# Patient Record
Sex: MALE | Race: WHITE | Employment: FULL TIME | URBAN - METROPOLITAN AREA
[De-identification: names, ages, dates, MRNs, and addresses within clinical notes are randomized per-mention and may not be internally consistent; named-entity substitution may affect disease eponyms.]

---

## 2017-03-26 ENCOUNTER — OFFICE VISIT (OUTPATIENT)
Dept: FAMILY MEDICINE CLINIC | Facility: CLINIC | Age: 52
End: 2017-03-26

## 2017-03-26 VITALS
HEIGHT: 74 IN | TEMPERATURE: 98 F | HEART RATE: 57 BPM | OXYGEN SATURATION: 99 % | DIASTOLIC BLOOD PRESSURE: 100 MMHG | SYSTOLIC BLOOD PRESSURE: 142 MMHG

## 2017-03-26 DIAGNOSIS — R05.9 COUGH: ICD-10-CM

## 2017-03-26 DIAGNOSIS — J01.90 ACUTE SINUSITIS, RECURRENCE NOT SPECIFIED, UNSPECIFIED LOCATION: ICD-10-CM

## 2017-03-26 DIAGNOSIS — H65.01 ACUTE SEROUS OTITIS MEDIA, RIGHT EAR: Primary | ICD-10-CM

## 2017-03-26 PROCEDURE — 99203 OFFICE O/P NEW LOW 30 MIN: CPT | Performed by: PHYSICIAN ASSISTANT

## 2017-03-26 RX ORDER — CEFDINIR 300 MG/1
300 CAPSULE ORAL 2 TIMES DAILY
Qty: 20 CAPSULE | Refills: 0 | Status: SHIPPED | OUTPATIENT
Start: 2017-03-26 | End: 2017-04-05

## 2017-03-26 RX ORDER — PREDNISONE 20 MG/1
TABLET ORAL
Qty: 10 TABLET | Refills: 0 | Status: SHIPPED | OUTPATIENT
Start: 2017-03-26 | End: 2018-04-23 | Stop reason: ALTCHOICE

## 2017-03-26 NOTE — PROGRESS NOTES
CHIEF COMPLAINT:   Patient presents with:  Ear Pain  Sinusitis: x1 month    HPI:   Rosa Maria Melara is a 54year old male who presents for upper and lower respiratory symptoms for  1 months with onset of right ear pain in last few days.   Patient reports PN NOSE: Nostrils patent, mild, thin clear nasal discharge, nasal mucosa erythematous and mildly inflamed. THROAT: Oral mucosa pink, moist. Posterior pharynx is not erythematous. No exudates.    LUNGS: clear to auscultation bilaterally, no wheezes or rhonchi Drinking extra fluids helps thin your mucus. This lets it drain from your sinuses more easily. Have a glass of water every hour or two. A humidifier helps in much the same way. Fluids can also offset the drying effects of certain medicines.  If you use a hu You have an infection of the middle ear, the space behind the eardrum. This is also called acute otitis media (AOM). Sometimes it is caused by the common cold.  This is because congestion can block the internal passage (eustachian tube) that drains fluid fr The patient indicates understanding of these issues and agrees to the plan. The patient is asked to see PCP if sx's persist or worsen.

## 2017-03-26 NOTE — PATIENT INSTRUCTIONS
Self-Care for Sinusitis     Drinking plenty of water can help sinuses drain. Sinusitis can often be managed with self-care. Self-care can keep sinuses moist and make you feel more comfortable. Remember to follow your doctor's instructions closely.  This Middle Ear Infection (Adult)  You have an infection of the middle ear, the space behind the eardrum. This is also called acute otitis media (AOM). Sometimes it is caused by the common cold.  This is because congestion can block the internal passage (eustach

## 2018-04-23 ENCOUNTER — OFFICE VISIT (OUTPATIENT)
Dept: FAMILY MEDICINE CLINIC | Facility: CLINIC | Age: 53
End: 2018-04-23

## 2018-04-23 VITALS
TEMPERATURE: 98 F | RESPIRATION RATE: 16 BRPM | OXYGEN SATURATION: 98 % | SYSTOLIC BLOOD PRESSURE: 148 MMHG | DIASTOLIC BLOOD PRESSURE: 88 MMHG | HEART RATE: 75 BPM

## 2018-04-23 DIAGNOSIS — R03.0 ELEVATED BP WITHOUT DIAGNOSIS OF HYPERTENSION: ICD-10-CM

## 2018-04-23 DIAGNOSIS — J01.00 ACUTE NON-RECURRENT MAXILLARY SINUSITIS: Primary | ICD-10-CM

## 2018-04-23 PROCEDURE — 99213 OFFICE O/P EST LOW 20 MIN: CPT | Performed by: PHYSICIAN ASSISTANT

## 2018-04-23 RX ORDER — AMOXICILLIN AND CLAVULANATE POTASSIUM 875; 125 MG/1; MG/1
1 TABLET, FILM COATED ORAL 2 TIMES DAILY
Qty: 20 TABLET | Refills: 0 | Status: SHIPPED | OUTPATIENT
Start: 2018-04-23 | End: 2018-05-03

## 2018-04-23 RX ORDER — BENZONATATE 200 MG/1
200 CAPSULE ORAL 3 TIMES DAILY PRN
Qty: 30 CAPSULE | Refills: 0 | Status: SHIPPED | OUTPATIENT
Start: 2018-04-23 | End: 2018-12-16 | Stop reason: ALTCHOICE

## 2018-04-23 RX ORDER — FLUTICASONE PROPIONATE 50 MCG
2 SPRAY, SUSPENSION (ML) NASAL DAILY
Qty: 1 INHALER | Refills: 0 | Status: SHIPPED | OUTPATIENT
Start: 2018-04-23 | End: 2018-12-16 | Stop reason: ALTCHOICE

## 2018-04-23 NOTE — PROGRESS NOTES
CHIEF COMPLAINT:   Patient presents with:  URI: 4 weeks. HPI:   Ce Upton is a 48year old male who presents for cold symptoms for now going on  4  weeks. Symptoms have progressed into sinus congestion and just isn't resolving since onset.   Sin sounding voice.    SKIN: no rashes,no suspicious lesions  HEAD: atraumatic, normocephalic, no facial swelling or erythema, no  tenderness on palpation of maxillary sinuses  EYES: conjunctiva clear, EOM intact  EARS: TM's non erythematous  NOSE: nasal mucosa

## 2018-12-16 ENCOUNTER — OFFICE VISIT (OUTPATIENT)
Dept: FAMILY MEDICINE CLINIC | Facility: CLINIC | Age: 53
End: 2018-12-16
Payer: COMMERCIAL

## 2018-12-16 VITALS
DIASTOLIC BLOOD PRESSURE: 90 MMHG | OXYGEN SATURATION: 99 % | HEART RATE: 75 BPM | SYSTOLIC BLOOD PRESSURE: 140 MMHG | TEMPERATURE: 98 F | RESPIRATION RATE: 16 BRPM

## 2018-12-16 DIAGNOSIS — J06.9 UPPER RESPIRATORY TRACT INFECTION, UNSPECIFIED TYPE: Primary | ICD-10-CM

## 2018-12-16 PROCEDURE — 87081 CULTURE SCREEN ONLY: CPT | Performed by: PHYSICIAN ASSISTANT

## 2018-12-16 PROCEDURE — 87880 STREP A ASSAY W/OPTIC: CPT | Performed by: PHYSICIAN ASSISTANT

## 2018-12-16 PROCEDURE — 99213 OFFICE O/P EST LOW 20 MIN: CPT | Performed by: PHYSICIAN ASSISTANT

## 2018-12-16 RX ORDER — FLUTICASONE PROPIONATE 50 MCG
2 SPRAY, SUSPENSION (ML) NASAL DAILY
Qty: 1 INHALER | Refills: 0 | Status: SHIPPED | OUTPATIENT
Start: 2018-12-16 | End: 2019-10-23 | Stop reason: ALTCHOICE

## 2018-12-16 RX ORDER — BENZONATATE 200 MG/1
200 CAPSULE ORAL 3 TIMES DAILY PRN
Qty: 30 CAPSULE | Refills: 0 | Status: SHIPPED | OUTPATIENT
Start: 2018-12-16 | End: 2019-10-23 | Stop reason: ALTCHOICE

## 2018-12-16 NOTE — PROGRESS NOTES
CHIEF COMPLAINT:   Patient presents with:  URI: congestion, cough with phlegm. HPI:   Keila Gaines is a 48year old male who presents for cold symptoms for  2  days. Complains mostly of phlemy cough and some burning in his throat when coughing.   L TM's non erythematolus  NOSE: nasal mucosa reddened and inflammed  THROAT: Posterior pharynx is  erythematous, small PND,  no exudates. NECK: supple, non-tender  LUNGS: clear to auscultation bilaterally, no wheezes or rhonchi. Breathing is non labored.   C

## 2018-12-16 NOTE — PATIENT INSTRUCTIONS
Viral Respiratory Illness [Adult]  You have an Upper Respiratory Illness (URI) caused by a virus. This illness is contagious during the first few days.  It is spread through the air by coughing and sneezing or by direct contact (touching the sick person a © 3600-6187 [address], [address] [place]. All rights reserved. This information is not intended as a substitute for professional medical care. Always follow your healthcare professional's instructions.

## 2019-10-22 PROBLEM — M25.522 PAIN AND SWELLING OF LEFT ELBOW: Status: ACTIVE | Noted: 2017-09-25

## 2019-10-22 PROBLEM — M25.422 PAIN AND SWELLING OF LEFT ELBOW: Status: ACTIVE | Noted: 2017-09-25

## 2019-10-23 ENCOUNTER — OFFICE VISIT (OUTPATIENT)
Dept: FAMILY MEDICINE CLINIC | Facility: CLINIC | Age: 54
End: 2019-10-23
Payer: COMMERCIAL

## 2019-10-23 VITALS
OXYGEN SATURATION: 98 % | HEIGHT: 72 IN | RESPIRATION RATE: 17 BRPM | TEMPERATURE: 99 F | HEART RATE: 82 BPM | BODY MASS INDEX: 39.6 KG/M2 | WEIGHT: 292.38 LBS | SYSTOLIC BLOOD PRESSURE: 146 MMHG | DIASTOLIC BLOOD PRESSURE: 80 MMHG

## 2019-10-23 DIAGNOSIS — E66.9 OBESITY (BMI 30-39.9): ICD-10-CM

## 2019-10-23 DIAGNOSIS — Z12.5 PROSTATE CANCER SCREENING: ICD-10-CM

## 2019-10-23 DIAGNOSIS — R06.83 SNORING: ICD-10-CM

## 2019-10-23 DIAGNOSIS — K21.9 GASTROESOPHAGEAL REFLUX DISEASE WITHOUT ESOPHAGITIS: ICD-10-CM

## 2019-10-23 DIAGNOSIS — R03.0 ELEVATED BLOOD PRESSURE READING: ICD-10-CM

## 2019-10-23 DIAGNOSIS — Z00.00 ENCOUNTER FOR ROUTINE ADULT HEALTH EXAMINATION WITHOUT ABNORMAL FINDINGS: Primary | ICD-10-CM

## 2019-10-23 DIAGNOSIS — Z28.21 INFLUENZA VACCINATION DECLINED BY PATIENT: ICD-10-CM

## 2019-10-23 PROBLEM — M25.522 PAIN AND SWELLING OF LEFT ELBOW: Status: RESOLVED | Noted: 2017-09-25 | Resolved: 2019-10-23

## 2019-10-23 PROBLEM — M25.422 PAIN AND SWELLING OF LEFT ELBOW: Status: RESOLVED | Noted: 2017-09-25 | Resolved: 2019-10-23

## 2019-10-23 PROCEDURE — 80050 GENERAL HEALTH PANEL: CPT | Performed by: FAMILY MEDICINE

## 2019-10-23 PROCEDURE — 83036 HEMOGLOBIN GLYCOSYLATED A1C: CPT | Performed by: FAMILY MEDICINE

## 2019-10-23 PROCEDURE — 36415 COLL VENOUS BLD VENIPUNCTURE: CPT | Performed by: FAMILY MEDICINE

## 2019-10-23 PROCEDURE — 99386 PREV VISIT NEW AGE 40-64: CPT | Performed by: FAMILY MEDICINE

## 2019-10-23 PROCEDURE — 80061 LIPID PANEL: CPT | Performed by: FAMILY MEDICINE

## 2019-10-23 PROCEDURE — 84153 ASSAY OF PSA TOTAL: CPT | Performed by: FAMILY MEDICINE

## 2019-10-23 NOTE — PROGRESS NOTES
Patient presents with:  Physical: annual      HPI:   Ade Fernandez is a 48year old male who presents for a complete physical exam.   Patient is present for complete physical. Feels very well. Up to date with dental visits. No hearing problems.  [place] regularly and does not follow a strict low-sodium diet. He is not a smoker. He has no HA, CP, Sob, or palpitations, or leg swelling.         Wt Readings from Last 3 Encounters:  10/23/19 : 292 lb 6.4 oz (132.6 kg)     BP Readings from Last 3 Encounters:  10 NEURO: no focal abnormalities, and reflexes coordination and gait normal and symmetric. MUSK/SKEL: Normal muscles tones, no joint abnormalities, full ROM of all extremities.     back- normal curves, no scoliosis, normal gait,  No joint or muscle abnorma kg/m²., recommended low fat diet and aerobic exercise 30 minutes three times weekly. The patient indicates understanding of these issues and agrees to the plan. Return in about 3 months (around 1/23/2020) for BP recheck, JANELLE magana.

## 2019-10-23 NOTE — PATIENT INSTRUCTIONS
Prevention Guidelines, Men Ages 48 to 59  Screening tests and vaccines are an important part of managing your health. A screening test is done to find possible disorders or diseases in people who don't have any symptoms.  The goal is to find a disease ear High cholesterol or triglycerides All men in this age group At least every 5 years   HIV Men at increased risk for infection – talk with your healthcare provider At routine exams   Lung cancer Adults age 54 to [de-identified] who have smoked Yearly screening in smokers Pneumococcal conjugate vaccine (PCV13) and pneumococcal polysaccharide vaccine (PPSV23) Men at increased risk for infection – talk with your healthcare provider PCV13: 1 dose ages 23 to 72 (protects against 13 types of pneumococcal bacteria)     PPSV23: 1 The esophagus is a tube that carries food from the mouth to the stomach. A valve (the LES, lower esophageal sphincter) at the lower end of the esophagus prevents stomach acid from flowing upward.  When this valve doesn't work properly, stomach contents may · If your symptoms occur during sleep, use a foam wedge to elevate your upper body (not just your head.) Or, place 4\" blocks under the head of your bed. Or use 2 bed risers under your bedframe.   Medicines  If needed, medicines can help relieve the symptom © 1699-7838 [address] [facility], [facility] [place]. All rights reserved. This information is not intended as a substitute for professional medical care. Always follow your healthcare professional's instructions.

## 2019-10-24 PROBLEM — E78.5 HYPERLIPIDEMIA: Status: ACTIVE | Noted: 2019-10-24

## 2019-10-24 PROBLEM — R73.03 PREDIABETES: Status: ACTIVE | Noted: 2019-10-24

## 2019-10-24 NOTE — PROGRESS NOTES
Pt notified via Geniu[facility]:  A1c 6.2, new onset prediabetes  Cholesterol is elevated (TC and LDL)  CBC,TSH, PSA all normal.  Asked to make appt at earliest convenience for results f-u.

## 2019-11-08 ENCOUNTER — OFFICE VISIT (OUTPATIENT)
Dept: FAMILY MEDICINE CLINIC | Facility: CLINIC | Age: 54
End: 2019-11-08
Payer: COMMERCIAL

## 2019-11-08 VITALS
SYSTOLIC BLOOD PRESSURE: 130 MMHG | BODY MASS INDEX: 39 KG/M2 | TEMPERATURE: 98 F | RESPIRATION RATE: 16 BRPM | WEIGHT: 290 LBS | HEART RATE: 89 BPM | OXYGEN SATURATION: 98 % | DIASTOLIC BLOOD PRESSURE: 82 MMHG

## 2019-11-08 DIAGNOSIS — J01.00 ACUTE NON-RECURRENT MAXILLARY SINUSITIS: Primary | ICD-10-CM

## 2019-11-08 PROCEDURE — 99213 OFFICE O/P EST LOW 20 MIN: CPT | Performed by: PHYSICIAN ASSISTANT

## 2019-11-08 RX ORDER — AMOXICILLIN AND CLAVULANATE POTASSIUM 875; 125 MG/1; MG/1
1 TABLET, FILM COATED ORAL 2 TIMES DAILY
Qty: 20 TABLET | Refills: 0 | Status: SHIPPED | OUTPATIENT
Start: 2019-11-08 | End: 2019-11-18

## 2019-11-08 RX ORDER — FLUTICASONE PROPIONATE 50 MCG
2 SPRAY, SUSPENSION (ML) NASAL DAILY
Qty: 1 INHALER | Refills: 0 | Status: SHIPPED | OUTPATIENT
Start: 2019-11-08

## 2019-11-08 NOTE — PROGRESS NOTES
CHIEF COMPLAINT:   Patient presents with:  URI      HPI:   Valerie Augustin is a 48year old male who presents for cold symptoms for  4  weeks. Symptoms have progressed into sinus congestion and has not been improving since onset 4 weeks ago.   Sinus conges maxillary sinuses  EYES: conjunctiva clear, EOM intact  EARS: TM's non erythematous  NOSE: nasal mucosa reddened and [id number]  THROAT: Posterior pharynx is non erythematous, small PND,  no exudates.   NECK: supple, non-tender  LUNGS: clear to auscultation

## 2020-01-20 ENCOUNTER — OFFICE VISIT (OUTPATIENT)
Dept: SLEEP CENTER | Age: 55
End: 2020-01-20
Attending: INTERNAL MEDICINE
Payer: COMMERCIAL

## 2020-01-20 DIAGNOSIS — I10 ESSENTIAL HYPERTENSION: ICD-10-CM

## 2020-01-20 DIAGNOSIS — R06.83 SNORING: ICD-10-CM

## 2020-01-20 DIAGNOSIS — G47.10 HYPERSOMNIA: ICD-10-CM

## 2020-01-20 DIAGNOSIS — R06.81 APNEA: ICD-10-CM

## 2020-01-20 PROCEDURE — 95810 POLYSOM 6/> YRS 4/> PARAM: CPT

## 2020-01-27 PROBLEM — G47.33 OSA (OBSTRUCTIVE SLEEP APNEA): Status: ACTIVE | Noted: 2020-01-27

## 2020-01-27 NOTE — PROCEDURES
[address] [address]       Accredited by the [facility] of Sleep Medicine (AASM)    PATIENT'S NAME:        Lenka Graves  ATTENDING PHYSICIAN:   Ximena Hackett M.D. REFERRING PHYSICIAN:   JONATHAN Baker yielding an overall apnea-hypopnea index of 29. Supine AHI was 46, lateral AHI was 23, REM AHI was 49. Oxygen saturation kathleen was 70% and patient spent 15% of sleep time with oxygen levels below 90%. PERIODIC LIMB MOVEMENTS:  PLM index of 2.     EKG:

## 2020-02-18 ENCOUNTER — OFFICE VISIT (OUTPATIENT)
Dept: SLEEP CENTER | Age: 55
End: 2020-02-18
Attending: INTERNAL MEDICINE
Payer: COMMERCIAL

## 2020-02-18 DIAGNOSIS — G47.33 OSA (OBSTRUCTIVE SLEEP APNEA): ICD-10-CM

## 2020-02-18 PROCEDURE — 95811 POLYSOM 6/>YRS CPAP 4/> PARM: CPT

## 2020-02-28 NOTE — PROCEDURES
[address] [address]       Accredited by the [facility] of Sleep Medicine (AASM)    PATIENT'S NAME:        Xena Johnsonnick  ATTENDING PHYSICIAN:   Emy Mares M.D. REFERRING PHYSICIAN:   JONATHAN Zacarias stage REM, 21%. RESPIRATORY MEASURES:  The patient was initiated on CPAP at 6 cm of water and rapidly titrated up to a final pressure of 15 cm.   On this setting, patient was able to achieve periods of stage REM sleep both in the supine and lateral posit

## 2020-10-29 ENCOUNTER — HOSPITAL ENCOUNTER (OUTPATIENT)
Dept: CT IMAGING | Age: 55
Discharge: HOME | End: 2020-10-29
Attending: FAMILY MEDICINE
Payer: COMMERCIAL

## 2020-10-29 ENCOUNTER — TELEPHONE (OUTPATIENT)
Dept: FAMILY MEDICINE CLINIC | Facility: CLINIC | Age: 55
End: 2020-10-29

## 2020-10-29 ENCOUNTER — OFFICE VISIT (OUTPATIENT)
Dept: FAMILY MEDICINE CLINIC | Facility: CLINIC | Age: 55
End: 2020-10-29
Payer: COMMERCIAL

## 2020-10-29 VITALS
BODY MASS INDEX: 37.88 KG/M2 | HEART RATE: 74 BPM | WEIGHT: 295.19 LBS | HEIGHT: 74 IN | DIASTOLIC BLOOD PRESSURE: 86 MMHG | SYSTOLIC BLOOD PRESSURE: 142 MMHG | RESPIRATION RATE: 16 BRPM | TEMPERATURE: 98 F | OXYGEN SATURATION: 95 %

## 2020-10-29 DIAGNOSIS — N13.39 OTHER HYDRONEPHROSIS: ICD-10-CM

## 2020-10-29 DIAGNOSIS — R31.29 OTHER MICROSCOPIC HEMATURIA: ICD-10-CM

## 2020-10-29 DIAGNOSIS — R10.9 FLANK PAIN: ICD-10-CM

## 2020-10-29 DIAGNOSIS — R10.9 FLANK PAIN: Primary | ICD-10-CM

## 2020-10-29 DIAGNOSIS — N20.0 RENAL CALCULUS, BILATERAL: ICD-10-CM

## 2020-10-29 PROCEDURE — 74176 CT ABD & PELVIS W/O CONTRAST: CPT | Performed by: FAMILY MEDICINE

## 2020-10-29 PROCEDURE — 81003 URINALYSIS AUTO W/O SCOPE: CPT | Performed by: FAMILY MEDICINE

## 2020-10-29 PROCEDURE — 87086 URINE CULTURE/COLONY COUNT: CPT | Performed by: FAMILY MEDICINE

## 2020-10-29 PROCEDURE — 3077F SYST BP >= 140 MM HG: CPT | Performed by: FAMILY MEDICINE

## 2020-10-29 PROCEDURE — 3008F BODY MASS INDEX DOCD: CPT | Performed by: FAMILY MEDICINE

## 2020-10-29 PROCEDURE — 3079F DIAST BP 80-89 MM HG: CPT | Performed by: FAMILY MEDICINE

## 2020-10-29 PROCEDURE — 99214 OFFICE O/P EST MOD 30 MIN: CPT | Performed by: FAMILY MEDICINE

## 2020-10-29 RX ORDER — TAMSULOSIN HYDROCHLORIDE 0.4 MG/1
0.4 CAPSULE ORAL DAILY
Qty: 30 CAPSULE | Refills: 0 | Status: SHIPPED | OUTPATIENT
Start: 2020-10-29 | End: 2020-11-28

## 2020-10-29 NOTE — PROGRESS NOTES
CHIEF COMPLAINT: Patient presents with:  Pain: patient c/o lower back pain x2 days         HPI:     Diane Ulrich is a 50year old male presents for back pain.     Back pain:  Pt states that his pain started along his left mid-back region on Tuesday nigh mg total) by mouth daily. 30 capsule 0   • Fluticasone Propionate 50 MCG/ACT Nasal Suspension 2 sprays by Each Nare route daily.  1 Inhaler 0       Allergies:  No Known Allergies    PSFH elements reviewed from today and agreed except as otherwise stated in trace-intact    • [facility] Urine 10/29/2020 8.0    • Protein Urine 10/29/2020 negative    • Urobilinogen Urine 10/29/2020 0.2    • Nitrite Urine 10/29/2020 negative    • Leukocyte Esterase Urine 10/29/2020 negative    • APPEARANCE 10/29/2020 clear    • Color Arlester Matas on 10/23/2020  Annual Physical due on 10/23/2020  PSA due on 10/23/2021  Pneumococcal Vaccine: Birth to 64yrs Completed      Patient/Caregiver Education: There are no barriers to learning. Medical education done.    Outcome: Patient verbalizes understanding

## 2020-10-29 NOTE — TELEPHONE ENCOUNTER
Patient reports to left side low back pain.  Only in the back no radiating  Llast week  It was on the right side   Denies difficulty urinating    Diarrhea x 1 Tuesday  He was taking pepto bismol after the diarrhea  No BM since Tuesday     Have you been in c

## 2020-10-29 NOTE — PATIENT INSTRUCTIONS
Flank Pain with Uncertain Cause    The flank is the area between your upper belly (abdomen) and your back. Pain there is often caused by a problem with your kidneys. It might be a kidney infection or a kidney stone.  Other causes of flank pain include spi © 1630-9488 [address]. [address] [facility] [place]. All rights reserved. This information is not intended as a substitute for professional medical care. Always follow your healthcare professional's instructions.

## 2020-10-30 NOTE — PROGRESS NOTES
Pt notified via Kraken[employer]:  Also, called pt tonight 10/29/20 at 2040 with results and treated with Flomax, increased fluids, NSAIDs prn pain. GO to ED if severe pain or symptoms worsen.

## 2020-11-30 ENCOUNTER — TELEPHONE (OUTPATIENT)
Dept: FAMILY MEDICINE CLINIC | Facility: CLINIC | Age: 55
End: 2020-11-30

## 2020-11-30 NOTE — TELEPHONE ENCOUNTER
Patient called stating he still has not passed his kidney stones and only has one more pill left. He is wondering what he should do.

## 2020-11-30 NOTE — TELEPHONE ENCOUNTER
If he is no longer having pain and can go about his daily activities, then he doesn't necessarily need to pass it (or sometimtes the stones will break up on its own and just be released in the urine as a fine sediment).  If he is still having colicky pain,

## 2020-11-30 NOTE — TELEPHONE ENCOUNTER
Spoke to pt and let them know message and recommendation per Dr Babar Keating. Patient voiced understanding. Pt states he has not had any pain in 1 week.   He will finish last pill of flomax and let us know if he experiences pain again

## 2021-03-16 ENCOUNTER — PATIENT OUTREACH (OUTPATIENT)
Dept: FAMILY MEDICINE CLINIC | Facility: CLINIC | Age: 56
End: 2021-03-16

## 2021-03-16 NOTE — PROGRESS NOTES
Notified patient he is due for annual physical.   He states he was planning to schedule physical toward the end of the year. Will call if anything needed sooner.

## 2021-04-09 DIAGNOSIS — Z23 NEED FOR VACCINATION: ICD-10-CM

## 2021-10-13 ENCOUNTER — OFFICE VISIT (OUTPATIENT)
Dept: FAMILY MEDICINE CLINIC | Facility: CLINIC | Age: 56
End: 2021-10-13
Payer: COMMERCIAL

## 2021-10-13 VITALS
TEMPERATURE: 98 F | WEIGHT: 284.63 LBS | OXYGEN SATURATION: 95 % | SYSTOLIC BLOOD PRESSURE: 150 MMHG | RESPIRATION RATE: 18 BRPM | DIASTOLIC BLOOD PRESSURE: 80 MMHG | HEIGHT: 74 IN | HEART RATE: 76 BPM | BODY MASS INDEX: 36.53 KG/M2

## 2021-10-13 DIAGNOSIS — M25.561 ACUTE PAIN OF RIGHT KNEE: ICD-10-CM

## 2021-10-13 DIAGNOSIS — Z00.00 ANNUAL PHYSICAL EXAM: Primary | ICD-10-CM

## 2021-10-13 DIAGNOSIS — Z12.5 PROSTATE CANCER SCREENING: ICD-10-CM

## 2021-10-13 DIAGNOSIS — Z13.29 SCREENING FOR ENDOCRINE, NUTRITIONAL, METABOLIC AND IMMUNITY DISORDER: ICD-10-CM

## 2021-10-13 DIAGNOSIS — Z13.21 SCREENING FOR ENDOCRINE, NUTRITIONAL, METABOLIC AND IMMUNITY DISORDER: ICD-10-CM

## 2021-10-13 DIAGNOSIS — E78.5 HYPERLIPIDEMIA, UNSPECIFIED HYPERLIPIDEMIA TYPE: ICD-10-CM

## 2021-10-13 DIAGNOSIS — G47.33 OSA (OBSTRUCTIVE SLEEP APNEA): ICD-10-CM

## 2021-10-13 DIAGNOSIS — Z13.228 SCREENING FOR ENDOCRINE, NUTRITIONAL, METABOLIC AND IMMUNITY DISORDER: ICD-10-CM

## 2021-10-13 DIAGNOSIS — R73.03 PREDIABETES: ICD-10-CM

## 2021-10-13 DIAGNOSIS — Z13.0 SCREENING FOR ENDOCRINE, NUTRITIONAL, METABOLIC AND IMMUNITY DISORDER: ICD-10-CM

## 2021-10-13 DIAGNOSIS — Z13.0 SCREENING FOR IRON DEFICIENCY ANEMIA: ICD-10-CM

## 2021-10-13 DIAGNOSIS — E66.9 OBESITY (BMI 30-39.9): ICD-10-CM

## 2021-10-13 PROCEDURE — 80061 LIPID PANEL: CPT | Performed by: FAMILY MEDICINE

## 2021-10-13 PROCEDURE — 3008F BODY MASS INDEX DOCD: CPT | Performed by: FAMILY MEDICINE

## 2021-10-13 PROCEDURE — 84153 ASSAY OF PSA TOTAL: CPT | Performed by: FAMILY MEDICINE

## 2021-10-13 PROCEDURE — 3077F SYST BP >= 140 MM HG: CPT | Performed by: FAMILY MEDICINE

## 2021-10-13 PROCEDURE — 99396 PREV VISIT EST AGE 40-64: CPT | Performed by: FAMILY MEDICINE

## 2021-10-13 PROCEDURE — 83036 HEMOGLOBIN GLYCOSYLATED A1C: CPT | Performed by: FAMILY MEDICINE

## 2021-10-13 PROCEDURE — 36415 COLL VENOUS BLD VENIPUNCTURE: CPT | Performed by: FAMILY MEDICINE

## 2021-10-13 PROCEDURE — 80050 GENERAL HEALTH PANEL: CPT | Performed by: FAMILY MEDICINE

## 2021-10-13 PROCEDURE — 3079F DIAST BP 80-89 MM HG: CPT | Performed by: FAMILY MEDICINE

## 2021-10-13 NOTE — PROGRESS NOTES
Patient came in for draw of ordered fasting labs. Patient drawn out of left AC, x 1 attempt and tolerated well. 1lav 1 ltgrn tube drawn.

## 2021-10-13 NOTE — PROGRESS NOTES
Patient presents with:  Physical      HPI:   Karel Lugo is a 49year old male who presents for a complete physical exam.   Patient is present for complete physical. Feels very well. Up to date with dental visits. No hearing problems.  Vaccinations: up 10/13/2021 23   10/23/2019 33     ALT (U/L)   Date Value   10/13/2021 55   10/23/2019 61      Current Outpatient Medications   Medication Sig Dispense Refill   • Fluticasone Propionate 50 MCG/ACT Nasal Suspension 2 sprays by Each Nare route daily.  (Bryce oz (129.1 kg)   SpO2 95%   BMI 36.54 kg/m²   GENERAL: WD/WN in no acute distress. HEENT: PERRLA and EOMI. OP moist no lesions.  TM normal, canals normal.  NECK: is supple,thyroid- normal.  LUNGS: are clear to auscultation bilaterally, with no wheeze, rho disorder    - COMP METABOLIC PANEL (14); Future  - TSH W REFLEX TO FREE T4; Futureg  - COMP METABOLIC PANEL (14)  - TSH W REFLEX TO FREE T4    6. Screening for iron deficiency anemia    - CBC WITH DIFFERENTIAL WITH PLATELET;  Future  - CBC WITH DIFFERENTIAL

## 2021-10-14 ENCOUNTER — TELEPHONE (OUTPATIENT)
Dept: FAMILY MEDICINE CLINIC | Facility: CLINIC | Age: 56
End: 2021-10-14

## 2021-10-14 DIAGNOSIS — E78.5 HYPERLIPIDEMIA, UNSPECIFIED HYPERLIPIDEMIA TYPE: ICD-10-CM

## 2021-10-14 DIAGNOSIS — R73.03 PREDIABETES: Primary | ICD-10-CM

## 2021-10-14 NOTE — TELEPHONE ENCOUNTER
LMOM to return call to the office to discuss lab results. Provided pt office phone [phone] along with office hours.     Wellness Compliance form completed and faxed to [phone]  Confirmation received

## 2021-10-14 NOTE — TELEPHONE ENCOUNTER
----- Message from Rafaela Hill MD sent at 10/14/2021  8:32 AM CDT -----  Please call pt to inform of results:  - prediabetes level is slightly better, now at 6.1 (6.5 and above is full-on diabetes).  Please make an effort ot work on eating low-c

## 2021-10-15 NOTE — TELEPHONE ENCOUNTER
Spoke to pt and let them know results and recommendation per Dr Luis Henry. Patient voiced understanding.  Mailed copy of pt's health form to home address per request     Pt wants to know if he was going to have knee xray ordered based on LOV   Please advise

## 2021-10-25 ENCOUNTER — HOSPITAL ENCOUNTER (OUTPATIENT)
Dept: GENERAL RADIOLOGY | Age: 56
Discharge: HOME | End: 2021-10-25
Attending: FAMILY MEDICINE
Payer: COMMERCIAL

## 2021-10-25 DIAGNOSIS — M25.561 ACUTE PAIN OF RIGHT KNEE: ICD-10-CM

## 2021-10-25 PROCEDURE — 73562 X-RAY EXAM OF KNEE 3: CPT | Performed by: FAMILY MEDICINE

## 2022-06-08 ENCOUNTER — NURSE ONLY (OUTPATIENT)
Dept: FAMILY MEDICINE CLINIC | Facility: CLINIC | Age: 57
End: 2022-06-08
Payer: COMMERCIAL

## 2022-06-08 DIAGNOSIS — E78.5 HYPERLIPIDEMIA, UNSPECIFIED HYPERLIPIDEMIA TYPE: ICD-10-CM

## 2022-06-08 DIAGNOSIS — R73.03 PREDIABETES: ICD-10-CM

## 2022-06-08 LAB
ALBUMIN SERPL-MCNC: 4.3 G/DL (ref 3.4–5)
ALBUMIN/GLOB SERPL: 1.5 {RATIO} (ref 1–2)
ALP LIVER SERPL-CCNC: 66 U/L
ALT SERPL-CCNC: 39 U/L
ANION GAP SERPL CALC-SCNC: 7 MMOL/L (ref 0–18)
AST SERPL-CCNC: 20 U/L (ref 15–37)
BILIRUB SERPL-MCNC: 1.1 MG/DL (ref 0.1–2)
BUN BLD-MCNC: 18 MG/DL (ref 7–18)
CALCIUM BLD-MCNC: 9.1 MG/DL (ref 8.5–10.1)
CHLORIDE SERPL-SCNC: 107 MMOL/L (ref 98–112)
CHOLEST SERPL-MCNC: 217 MG/DL (ref ?–200)
CO2 SERPL-SCNC: 26 MMOL/L (ref 21–32)
CREAT BLD-MCNC: 0.86 MG/DL
EST. AVERAGE GLUCOSE BLD GHB EST-MCNC: 131 MG/DL (ref 68–126)
FASTING PATIENT LIPID ANSWER: YES
FASTING STATUS PATIENT QL REPORTED: YES
GLOBULIN PLAS-MCNC: 2.9 G/DL (ref 2.8–4.4)
GLUCOSE BLD-MCNC: 97 MG/DL (ref 70–99)
HBA1C MFR BLD: 6.2 % (ref ?–5.7)
HDLC SERPL-MCNC: 58 MG/DL (ref 40–59)
LDLC SERPL CALC-MCNC: 144 MG/DL (ref ?–100)
NONHDLC SERPL-MCNC: 159 MG/DL (ref ?–130)
OSMOLALITY SERPL CALC.SUM OF ELEC: 292 MOSM/KG (ref 275–295)
POTASSIUM SERPL-SCNC: 3.8 MMOL/L (ref 3.5–5.1)
PROT SERPL-MCNC: 7.2 G/DL (ref 6.4–8.2)
SODIUM SERPL-SCNC: 140 MMOL/L (ref 136–145)
TRIGL SERPL-MCNC: 85 MG/DL (ref 30–149)
VLDLC SERPL CALC-MCNC: 16 MG/DL (ref 0–30)

## 2022-06-08 PROCEDURE — 80053 COMPREHEN METABOLIC PANEL: CPT | Performed by: FAMILY MEDICINE

## 2022-06-08 PROCEDURE — 80061 LIPID PANEL: CPT | Performed by: FAMILY MEDICINE

## 2022-06-08 PROCEDURE — 36415 COLL VENOUS BLD VENIPUNCTURE: CPT | Performed by: FAMILY MEDICINE

## 2022-06-08 PROCEDURE — 83036 HEMOGLOBIN GLYCOSYLATED A1C: CPT | Performed by: FAMILY MEDICINE

## 2022-06-08 NOTE — PROGRESS NOTES
Patient came in for draw of ordered fasting labs. Patient drawn out of left  AC, x 1 attempt and tolerated well. 1 lav 1 gold(labeled ltgrn)  tube drawn.

## 2022-06-16 ENCOUNTER — OFFICE VISIT (OUTPATIENT)
Dept: FAMILY MEDICINE CLINIC | Facility: CLINIC | Age: 57
End: 2022-06-16
Payer: COMMERCIAL

## 2022-06-16 VITALS
HEART RATE: 80 BPM | BODY MASS INDEX: 35.94 KG/M2 | DIASTOLIC BLOOD PRESSURE: 78 MMHG | OXYGEN SATURATION: 97 % | TEMPERATURE: 98 F | HEIGHT: 74 IN | SYSTOLIC BLOOD PRESSURE: 144 MMHG | WEIGHT: 280 LBS

## 2022-06-16 DIAGNOSIS — R73.03 PREDIABETES: Primary | ICD-10-CM

## 2022-06-16 DIAGNOSIS — I10 ESSENTIAL HYPERTENSION, BENIGN: ICD-10-CM

## 2022-06-16 DIAGNOSIS — E78.5 HYPERLIPIDEMIA, UNSPECIFIED HYPERLIPIDEMIA TYPE: ICD-10-CM

## 2022-06-16 DIAGNOSIS — G89.29 CHRONIC PAIN OF RIGHT KNEE: ICD-10-CM

## 2022-06-16 DIAGNOSIS — M25.561 CHRONIC PAIN OF RIGHT KNEE: ICD-10-CM

## 2022-06-16 PROCEDURE — 3077F SYST BP >= 140 MM HG: CPT | Performed by: FAMILY MEDICINE

## 2022-06-16 PROCEDURE — 3078F DIAST BP <80 MM HG: CPT | Performed by: FAMILY MEDICINE

## 2022-06-16 PROCEDURE — 99214 OFFICE O/P EST MOD 30 MIN: CPT | Performed by: FAMILY MEDICINE

## 2022-06-16 PROCEDURE — 3008F BODY MASS INDEX DOCD: CPT | Performed by: FAMILY MEDICINE

## 2022-06-16 RX ORDER — LISINOPRIL 10 MG/1
10 TABLET ORAL DAILY
Qty: 30 TABLET | Refills: 0 | Status: SHIPPED | OUTPATIENT
Start: 2022-06-16

## 2022-07-06 ENCOUNTER — OFFICE VISIT (OUTPATIENT)
Dept: FAMILY MEDICINE CLINIC | Facility: CLINIC | Age: 57
End: 2022-07-06
Payer: COMMERCIAL

## 2022-07-06 VITALS
WEIGHT: 293 LBS | OXYGEN SATURATION: 94 % | TEMPERATURE: 98 F | DIASTOLIC BLOOD PRESSURE: 70 MMHG | HEART RATE: 70 BPM | SYSTOLIC BLOOD PRESSURE: 122 MMHG | BODY MASS INDEX: 38 KG/M2

## 2022-07-06 DIAGNOSIS — I10 ESSENTIAL HYPERTENSION, BENIGN: Primary | ICD-10-CM

## 2022-07-06 PROCEDURE — 3078F DIAST BP <80 MM HG: CPT | Performed by: FAMILY MEDICINE

## 2022-07-06 PROCEDURE — 99213 OFFICE O/P EST LOW 20 MIN: CPT | Performed by: FAMILY MEDICINE

## 2022-07-06 PROCEDURE — 3074F SYST BP LT 130 MM HG: CPT | Performed by: FAMILY MEDICINE

## 2022-07-06 RX ORDER — LISINOPRIL 10 MG/1
10 TABLET ORAL DAILY
Qty: 90 TABLET | Refills: 1 | Status: SHIPPED | OUTPATIENT
Start: 2022-07-06

## 2022-09-28 ENCOUNTER — TELEPHONE (OUTPATIENT)
Dept: FAMILY MEDICINE CLINIC | Facility: CLINIC | Age: 57
End: 2022-09-28

## 2022-09-28 ENCOUNTER — OFFICE VISIT (OUTPATIENT)
Dept: FAMILY MEDICINE CLINIC | Facility: CLINIC | Age: 57
End: 2022-09-28

## 2022-09-28 VITALS
SYSTOLIC BLOOD PRESSURE: 126 MMHG | RESPIRATION RATE: 18 BRPM | WEIGHT: 285.81 LBS | HEIGHT: 71.26 IN | DIASTOLIC BLOOD PRESSURE: 72 MMHG | OXYGEN SATURATION: 98 % | BODY MASS INDEX: 39.57 KG/M2 | TEMPERATURE: 98 F | HEART RATE: 70 BPM

## 2022-09-28 DIAGNOSIS — I10 ESSENTIAL HYPERTENSION, BENIGN: ICD-10-CM

## 2022-09-28 DIAGNOSIS — Z12.11 COLON CANCER SCREENING: ICD-10-CM

## 2022-09-28 DIAGNOSIS — R73.03 PREDIABETES: ICD-10-CM

## 2022-09-28 DIAGNOSIS — Z13.29 SCREENING FOR ENDOCRINE, METABOLIC, AND IMMUNITY DISORDER: ICD-10-CM

## 2022-09-28 DIAGNOSIS — Z00.00 ANNUAL PHYSICAL EXAM: Primary | ICD-10-CM

## 2022-09-28 DIAGNOSIS — Z13.0 SCREENING FOR ENDOCRINE, METABOLIC, AND IMMUNITY DISORDER: ICD-10-CM

## 2022-09-28 DIAGNOSIS — Z13.228 SCREENING FOR ENDOCRINE, METABOLIC, AND IMMUNITY DISORDER: ICD-10-CM

## 2022-09-28 DIAGNOSIS — E78.5 HYPERLIPIDEMIA, UNSPECIFIED HYPERLIPIDEMIA TYPE: ICD-10-CM

## 2022-09-28 LAB
ALBUMIN SERPL-MCNC: 3.9 G/DL (ref 3.4–5)
ALBUMIN/GLOB SERPL: 1.1 {RATIO} (ref 1–2)
ALP LIVER SERPL-CCNC: 56 U/L
ALT SERPL-CCNC: 56 U/L
ANION GAP SERPL CALC-SCNC: 3 MMOL/L (ref 0–18)
AST SERPL-CCNC: 22 U/L (ref 15–37)
BASOPHILS # BLD AUTO: 0.04 X10(3) UL (ref 0–0.2)
BASOPHILS NFR BLD AUTO: 0.6 %
BILIRUB SERPL-MCNC: 1 MG/DL (ref 0.1–2)
BUN BLD-MCNC: 13 MG/DL (ref 7–18)
CALCIUM BLD-MCNC: 9 MG/DL (ref 8.5–10.1)
CHLORIDE SERPL-SCNC: 109 MMOL/L (ref 98–112)
CHOLEST SERPL-MCNC: 203 MG/DL (ref ?–200)
CO2 SERPL-SCNC: 29 MMOL/L (ref 21–32)
CREAT BLD-MCNC: 0.79 MG/DL
EOSINOPHIL # BLD AUTO: 0.15 X10(3) UL (ref 0–0.7)
EOSINOPHIL NFR BLD AUTO: 2.1 %
ERYTHROCYTE [DISTWIDTH] IN BLOOD BY AUTOMATED COUNT: 13 %
EST. AVERAGE GLUCOSE BLD GHB EST-MCNC: 123 MG/DL (ref 68–126)
FASTING PATIENT LIPID ANSWER: YES
FASTING STATUS PATIENT QL REPORTED: YES
GFR SERPLBLD BASED ON 1.73 SQ M-ARVRAT: 105 ML/MIN/1.73M2 (ref 60–?)
GLOBULIN PLAS-MCNC: 3.4 G/DL (ref 2.8–4.4)
GLUCOSE BLD-MCNC: 93 MG/DL (ref 70–99)
HBA1C MFR BLD: 5.9 % (ref ?–5.7)
HCT VFR BLD AUTO: 45.4 %
HDLC SERPL-MCNC: 56 MG/DL (ref 40–59)
HGB BLD-MCNC: 14.5 G/DL
IMM GRANULOCYTES # BLD AUTO: 0.02 X10(3) UL (ref 0–1)
IMM GRANULOCYTES NFR BLD: 0.3 %
LDLC SERPL CALC-MCNC: 131 MG/DL (ref ?–100)
LYMPHOCYTES # BLD AUTO: 2.1 X10(3) UL (ref 1–4)
LYMPHOCYTES NFR BLD AUTO: 29.1 %
MCH RBC QN AUTO: 30.1 PG (ref 26–34)
MCHC RBC AUTO-ENTMCNC: 31.9 G/DL (ref 31–37)
MCV RBC AUTO: 94.2 FL
MONOCYTES # BLD AUTO: 0.44 X10(3) UL (ref 0.1–1)
MONOCYTES NFR BLD AUTO: 6.1 %
NEUTROPHILS # BLD AUTO: 4.46 X10 (3) UL (ref 1.5–7.7)
NEUTROPHILS # BLD AUTO: 4.46 X10(3) UL (ref 1.5–7.7)
NEUTROPHILS NFR BLD AUTO: 61.8 %
NONHDLC SERPL-MCNC: 147 MG/DL (ref ?–130)
OSMOLALITY SERPL CALC.SUM OF ELEC: 292 MOSM/KG (ref 275–295)
PLATELET # BLD AUTO: 188 10(3)UL (ref 150–450)
POTASSIUM SERPL-SCNC: 4.2 MMOL/L (ref 3.5–5.1)
PROT SERPL-MCNC: 7.3 G/DL (ref 6.4–8.2)
RBC # BLD AUTO: 4.82 X10(6)UL
SODIUM SERPL-SCNC: 141 MMOL/L (ref 136–145)
TRIGL SERPL-MCNC: 91 MG/DL (ref 30–149)
TSI SER-ACNC: 1.88 MIU/ML (ref 0.36–3.74)
VLDLC SERPL CALC-MCNC: 16 MG/DL (ref 0–30)
WBC # BLD AUTO: 7.2 X10(3) UL (ref 4–11)

## 2022-09-28 PROCEDURE — 3074F SYST BP LT 130 MM HG: CPT | Performed by: FAMILY MEDICINE

## 2022-09-28 PROCEDURE — 36415 COLL VENOUS BLD VENIPUNCTURE: CPT | Performed by: FAMILY MEDICINE

## 2022-09-28 PROCEDURE — 80061 LIPID PANEL: CPT | Performed by: FAMILY MEDICINE

## 2022-09-28 PROCEDURE — 83036 HEMOGLOBIN GLYCOSYLATED A1C: CPT | Performed by: FAMILY MEDICINE

## 2022-09-28 PROCEDURE — 3078F DIAST BP <80 MM HG: CPT | Performed by: FAMILY MEDICINE

## 2022-09-28 PROCEDURE — 99396 PREV VISIT EST AGE 40-64: CPT | Performed by: FAMILY MEDICINE

## 2022-09-28 PROCEDURE — 80050 GENERAL HEALTH PANEL: CPT | Performed by: FAMILY MEDICINE

## 2022-09-28 PROCEDURE — 3008F BODY MASS INDEX DOCD: CPT | Performed by: FAMILY MEDICINE

## 2022-09-28 RX ORDER — LISINOPRIL 10 MG/1
10 TABLET ORAL DAILY
Qty: 90 TABLET | Refills: 1 | Status: SHIPPED | OUTPATIENT
Start: 2022-09-28

## 2022-09-28 NOTE — PROGRESS NOTES
Patient came in for draw of ordered fasting labs. Patient drawn out of leonelth AC, x 1 attempt and tolerated well.  1 lab 1 ltgrn  tube drawn.

## 2022-09-29 NOTE — TELEPHONE ENCOUNTER
Outreach call to pt, informed of test results completed 09/28/2022 and provider indications. Also informed of need to repeat colonoscopy based on colonoscopy report from 09/27/2019 and informed of referral placed. PT VU and denies questions or concerns at this time. Aware his wellness form for employer has been completed and pt request form faxed to listed fax on form and request original to mailed to his home address. Form faxed to [phone] and confirmation received.      Copy sent to scan and original mailed to pt home

## 2022-09-29 NOTE — TELEPHONE ENCOUNTER
Please see result note message regarding his health screen form from his labs 9/28/22. Also, I reviewed his colonoscopy record, it appears he is due to repeat this, since his last one was 9/27/2019, and since he had polyps, the GI recommended a 3 yr recall. I placed referral to go back to see Dr. Toshia Butler at [facility] for his. If he prefers someone different, I can refer him to [facility] instead. Please inform pt. Thanks.

## 2023-04-09 DIAGNOSIS — R73.03 PREDIABETES: ICD-10-CM

## 2023-04-09 DIAGNOSIS — I10 ESSENTIAL HYPERTENSION, BENIGN: ICD-10-CM

## 2023-04-10 RX ORDER — LISINOPRIL 10 MG/1
TABLET ORAL
Qty: 90 TABLET | Refills: 1 | OUTPATIENT
Start: 2023-04-10

## 2023-04-18 ENCOUNTER — NURSE ONLY (OUTPATIENT)
Dept: FAMILY MEDICINE CLINIC | Facility: CLINIC | Age: 58
End: 2023-04-18
Payer: COMMERCIAL

## 2023-04-18 DIAGNOSIS — E78.5 HYPERLIPIDEMIA, UNSPECIFIED HYPERLIPIDEMIA TYPE: ICD-10-CM

## 2023-04-18 DIAGNOSIS — I10 ESSENTIAL HYPERTENSION, BENIGN: ICD-10-CM

## 2023-04-18 DIAGNOSIS — R73.03 PREDIABETES: ICD-10-CM

## 2023-04-18 LAB
ALBUMIN SERPL-MCNC: 4.2 G/DL (ref 3.4–5)
ALBUMIN/GLOB SERPL: 1.3 {RATIO} (ref 1–2)
ALP LIVER SERPL-CCNC: 55 U/L
ALT SERPL-CCNC: 43 U/L
ANION GAP SERPL CALC-SCNC: 3 MMOL/L (ref 0–18)
AST SERPL-CCNC: 28 U/L (ref 15–37)
BILIRUB SERPL-MCNC: 0.8 MG/DL (ref 0.1–2)
BUN BLD-MCNC: 15 MG/DL (ref 7–18)
CALCIUM BLD-MCNC: 9.5 MG/DL (ref 8.5–10.1)
CHLORIDE SERPL-SCNC: 106 MMOL/L (ref 98–112)
CHOLEST SERPL-MCNC: 201 MG/DL (ref ?–200)
CO2 SERPL-SCNC: 29 MMOL/L (ref 21–32)
CREAT BLD-MCNC: 0.92 MG/DL
EST. AVERAGE GLUCOSE BLD GHB EST-MCNC: 128 MG/DL (ref 68–126)
FASTING PATIENT LIPID ANSWER: YES
FASTING STATUS PATIENT QL REPORTED: YES
GFR SERPLBLD BASED ON 1.73 SQ M-ARVRAT: 98 ML/MIN/1.73M2 (ref 60–?)
GLOBULIN PLAS-MCNC: 3.3 G/DL (ref 2.8–4.4)
GLUCOSE BLD-MCNC: 95 MG/DL (ref 70–99)
HBA1C MFR BLD: 6.1 % (ref ?–5.7)
HDLC SERPL-MCNC: 51 MG/DL (ref 40–59)
LDLC SERPL CALC-MCNC: 129 MG/DL (ref ?–100)
NONHDLC SERPL-MCNC: 150 MG/DL (ref ?–130)
OSMOLALITY SERPL CALC.SUM OF ELEC: 287 MOSM/KG (ref 275–295)
POTASSIUM SERPL-SCNC: 4 MMOL/L (ref 3.5–5.1)
PROT SERPL-MCNC: 7.5 G/DL (ref 6.4–8.2)
SODIUM SERPL-SCNC: 138 MMOL/L (ref 136–145)
TRIGL SERPL-MCNC: 117 MG/DL (ref 30–149)
VLDLC SERPL CALC-MCNC: 21 MG/DL (ref 0–30)

## 2023-04-18 PROCEDURE — 83036 HEMOGLOBIN GLYCOSYLATED A1C: CPT | Performed by: FAMILY MEDICINE

## 2023-04-18 PROCEDURE — 36415 COLL VENOUS BLD VENIPUNCTURE: CPT | Performed by: FAMILY MEDICINE

## 2023-04-18 PROCEDURE — 80061 LIPID PANEL: CPT | Performed by: FAMILY MEDICINE

## 2023-04-18 PROCEDURE — 80053 COMPREHEN METABOLIC PANEL: CPT | Performed by: FAMILY MEDICINE

## 2023-04-18 RX ORDER — LISINOPRIL 10 MG/1
10 TABLET ORAL DAILY
Qty: 90 TABLET | Refills: 1 | Status: SHIPPED | OUTPATIENT
Start: 2023-04-18

## 2023-04-18 NOTE — TELEPHONE ENCOUNTER
Refill Passed Protocol:     Pt requesting refill of lisinopril    Refill was approved and sent to pharmacy:     Last Time Medication was Filled: 9/28/2022    Last Office Visit with Provider: 9/28/2022    Appt. scheduled on 4/24/2023

## 2023-04-18 NOTE — TELEPHONE ENCOUNTER
Refill Failed Protocol:     Pt requesting refill of metformin     Last Time Medication was Filled: 9/28/2022    Last Office Visit with Provider: 9/28/2022    Unable to approve refill, Refill protocol failed because the patient did not meet the protocol criteria.   Diabetic Medication Protocol Failed 04/18/2023 03:47 PM    HgBA1C procedure resulted in past 6 months    Last HgBA1C < 7.5    Microalbumin procedure in past 12 months or taking ACE/ARB    Appointment in past 6 or next 3 months     Appt. scheduled on 4/24/2023

## 2023-04-24 ENCOUNTER — OFFICE VISIT (OUTPATIENT)
Dept: FAMILY MEDICINE CLINIC | Facility: CLINIC | Age: 58
End: 2023-04-24
Payer: COMMERCIAL

## 2023-04-24 VITALS
TEMPERATURE: 100 F | WEIGHT: 288.19 LBS | SYSTOLIC BLOOD PRESSURE: 130 MMHG | RESPIRATION RATE: 18 BRPM | BODY MASS INDEX: 40 KG/M2 | DIASTOLIC BLOOD PRESSURE: 78 MMHG | OXYGEN SATURATION: 94 % | HEART RATE: 68 BPM

## 2023-04-24 DIAGNOSIS — I10 ESSENTIAL HYPERTENSION, BENIGN: ICD-10-CM

## 2023-04-24 DIAGNOSIS — N52.9 ERECTILE DYSFUNCTION, UNSPECIFIED ERECTILE DYSFUNCTION TYPE: ICD-10-CM

## 2023-04-24 DIAGNOSIS — R73.03 PREDIABETES: Primary | ICD-10-CM

## 2023-04-24 DIAGNOSIS — E78.5 HYPERLIPIDEMIA, UNSPECIFIED HYPERLIPIDEMIA TYPE: ICD-10-CM

## 2023-04-24 PROCEDURE — 3075F SYST BP GE 130 - 139MM HG: CPT | Performed by: FAMILY MEDICINE

## 2023-04-24 PROCEDURE — 3078F DIAST BP <80 MM HG: CPT | Performed by: FAMILY MEDICINE

## 2023-04-24 PROCEDURE — 99214 OFFICE O/P EST MOD 30 MIN: CPT | Performed by: FAMILY MEDICINE

## 2023-04-24 RX ORDER — SILDENAFIL 50 MG/1
50 TABLET, FILM COATED ORAL
Qty: 10 TABLET | Refills: 5 | Status: SHIPPED | OUTPATIENT
Start: 2023-04-24

## 2023-07-09 DIAGNOSIS — N52.9 ERECTILE DYSFUNCTION, UNSPECIFIED ERECTILE DYSFUNCTION TYPE: ICD-10-CM

## 2023-07-09 DIAGNOSIS — I10 ESSENTIAL HYPERTENSION, BENIGN: ICD-10-CM

## 2023-07-10 RX ORDER — SILDENAFIL 50 MG/1
50 TABLET, FILM COATED ORAL
Qty: 10 TABLET | Refills: 5 | Status: SHIPPED | OUTPATIENT
Start: 2023-07-10

## 2023-07-10 RX ORDER — LISINOPRIL 10 MG/1
10 TABLET ORAL DAILY
Qty: 90 TABLET | Refills: 1 | Status: SHIPPED | OUTPATIENT
Start: 2023-07-10

## 2023-07-10 NOTE — TELEPHONE ENCOUNTER
Refill no protocol available:     Pt requesting refill of sildenafil citrate    Sent to Provider for review:    Last Time Medication was Filled: 4/24/2023    Last Office Visit with Provider: 4/24/2023    No future appointments. Refill Passed Protocol:     Pt requesting refill of lisinopril    Refill was approved and sent to pharmacy:     Last Time Medication was Filled:  4/18/2023    Last Office Visit with Provider: 4/24/2023    No future appointments.

## 2023-11-13 ENCOUNTER — OFFICE VISIT (OUTPATIENT)
Dept: FAMILY MEDICINE CLINIC | Facility: CLINIC | Age: 58
End: 2023-11-13
Payer: COMMERCIAL

## 2023-11-13 VITALS
BODY MASS INDEX: 38.35 KG/M2 | WEIGHT: 289.38 LBS | OXYGEN SATURATION: 96 % | HEART RATE: 83 BPM | DIASTOLIC BLOOD PRESSURE: 74 MMHG | RESPIRATION RATE: 18 BRPM | TEMPERATURE: 98 F | HEIGHT: 72.8 IN | SYSTOLIC BLOOD PRESSURE: 136 MMHG

## 2023-11-13 DIAGNOSIS — Z12.11 SCREEN FOR COLON CANCER: ICD-10-CM

## 2023-11-13 DIAGNOSIS — Z13.6 SCREENING FOR ISCHEMIC HEART DISEASE: ICD-10-CM

## 2023-11-13 DIAGNOSIS — Z13.0 SCREENING FOR ENDOCRINE, NUTRITIONAL, METABOLIC AND IMMUNITY DISORDER: ICD-10-CM

## 2023-11-13 DIAGNOSIS — N52.9 ERECTILE DYSFUNCTION, UNSPECIFIED ERECTILE DYSFUNCTION TYPE: ICD-10-CM

## 2023-11-13 DIAGNOSIS — E78.5 HYPERLIPIDEMIA, UNSPECIFIED HYPERLIPIDEMIA TYPE: ICD-10-CM

## 2023-11-13 DIAGNOSIS — R73.03 PREDIABETES: ICD-10-CM

## 2023-11-13 DIAGNOSIS — Z13.228 SCREENING FOR ENDOCRINE, NUTRITIONAL, METABOLIC AND IMMUNITY DISORDER: ICD-10-CM

## 2023-11-13 DIAGNOSIS — I10 ESSENTIAL HYPERTENSION, BENIGN: ICD-10-CM

## 2023-11-13 DIAGNOSIS — Z13.21 SCREENING FOR ENDOCRINE, NUTRITIONAL, METABOLIC AND IMMUNITY DISORDER: ICD-10-CM

## 2023-11-13 DIAGNOSIS — Z13.29 SCREENING FOR ENDOCRINE, NUTRITIONAL, METABOLIC AND IMMUNITY DISORDER: ICD-10-CM

## 2023-11-13 DIAGNOSIS — Z12.5 SCREENING FOR PROSTATE CANCER: ICD-10-CM

## 2023-11-13 DIAGNOSIS — Z23 NEED FOR VACCINATION: ICD-10-CM

## 2023-11-13 DIAGNOSIS — Z00.00 ANNUAL PHYSICAL EXAM: Primary | ICD-10-CM

## 2023-11-13 LAB
ALBUMIN SERPL-MCNC: 4.1 G/DL (ref 3.4–5)
ALBUMIN/GLOB SERPL: 1.3 {RATIO} (ref 1–2)
ALP LIVER SERPL-CCNC: 59 U/L
ALT SERPL-CCNC: 35 U/L
ANION GAP SERPL CALC-SCNC: 4 MMOL/L (ref 0–18)
AST SERPL-CCNC: 17 U/L (ref 15–37)
BASOPHILS # BLD AUTO: 0.04 X10(3) UL (ref 0–0.2)
BASOPHILS NFR BLD AUTO: 0.5 %
BILIRUB SERPL-MCNC: 1 MG/DL (ref 0.1–2)
BUN BLD-MCNC: 15 MG/DL (ref 9–23)
CALCIUM BLD-MCNC: 9.3 MG/DL (ref 8.5–10.1)
CHLORIDE SERPL-SCNC: 106 MMOL/L (ref 98–112)
CHOLEST SERPL-MCNC: 198 MG/DL (ref ?–200)
CO2 SERPL-SCNC: 29 MMOL/L (ref 21–32)
CREAT BLD-MCNC: 1.09 MG/DL
EGFRCR SERPLBLD CKD-EPI 2021: 80 ML/MIN/1.73M2 (ref 60–?)
EOSINOPHIL # BLD AUTO: 0.16 X10(3) UL (ref 0–0.7)
EOSINOPHIL NFR BLD AUTO: 2 %
ERYTHROCYTE [DISTWIDTH] IN BLOOD BY AUTOMATED COUNT: 12.7 %
FASTING PATIENT LIPID ANSWER: YES
FASTING STATUS PATIENT QL REPORTED: YES
GLOBULIN PLAS-MCNC: 3.2 G/DL (ref 2.8–4.4)
GLUCOSE BLD-MCNC: 103 MG/DL (ref 70–99)
HCT VFR BLD AUTO: 45.3 %
HDLC SERPL-MCNC: 55 MG/DL (ref 40–59)
HGB BLD-MCNC: 15 G/DL
IMM GRANULOCYTES # BLD AUTO: 0.03 X10(3) UL (ref 0–1)
IMM GRANULOCYTES NFR BLD: 0.4 %
LDLC SERPL CALC-MCNC: 132 MG/DL (ref ?–100)
LYMPHOCYTES # BLD AUTO: 1.92 X10(3) UL (ref 1–4)
LYMPHOCYTES NFR BLD AUTO: 24.6 %
MCH RBC QN AUTO: 30 PG (ref 26–34)
MCHC RBC AUTO-ENTMCNC: 33.1 G/DL (ref 31–37)
MCV RBC AUTO: 90.6 FL
MONOCYTES # BLD AUTO: 0.49 X10(3) UL (ref 0.1–1)
MONOCYTES NFR BLD AUTO: 6.3 %
NEUTROPHILS # BLD AUTO: 5.18 X10 (3) UL (ref 1.5–7.7)
NEUTROPHILS # BLD AUTO: 5.18 X10(3) UL (ref 1.5–7.7)
NEUTROPHILS NFR BLD AUTO: 66.2 %
NONHDLC SERPL-MCNC: 143 MG/DL (ref ?–130)
OSMOLALITY SERPL CALC.SUM OF ELEC: 289 MOSM/KG (ref 275–295)
PLATELET # BLD AUTO: 202 10(3)UL (ref 150–450)
POTASSIUM SERPL-SCNC: 4.3 MMOL/L (ref 3.5–5.1)
PROT SERPL-MCNC: 7.3 G/DL (ref 6.4–8.2)
PSA SERPL-MCNC: 0.56 NG/ML (ref ?–4)
RBC # BLD AUTO: 5 X10(6)UL
SODIUM SERPL-SCNC: 139 MMOL/L (ref 136–145)
TRIGL SERPL-MCNC: 61 MG/DL (ref 30–149)
TSI SER-ACNC: 1.79 MIU/ML (ref 0.36–3.74)
VLDLC SERPL CALC-MCNC: 11 MG/DL (ref 0–30)
WBC # BLD AUTO: 7.8 X10(3) UL (ref 4–11)

## 2023-11-13 PROCEDURE — 36415 COLL VENOUS BLD VENIPUNCTURE: CPT | Performed by: FAMILY MEDICINE

## 2023-11-13 PROCEDURE — 99396 PREV VISIT EST AGE 40-64: CPT | Performed by: FAMILY MEDICINE

## 2023-11-13 PROCEDURE — 80061 LIPID PANEL: CPT | Performed by: FAMILY MEDICINE

## 2023-11-13 PROCEDURE — 3075F SYST BP GE 130 - 139MM HG: CPT | Performed by: FAMILY MEDICINE

## 2023-11-13 PROCEDURE — 80050 GENERAL HEALTH PANEL: CPT | Performed by: FAMILY MEDICINE

## 2023-11-13 PROCEDURE — 83036 HEMOGLOBIN GLYCOSYLATED A1C: CPT | Performed by: FAMILY MEDICINE

## 2023-11-13 PROCEDURE — 3078F DIAST BP <80 MM HG: CPT | Performed by: FAMILY MEDICINE

## 2023-11-13 PROCEDURE — 3008F BODY MASS INDEX DOCD: CPT | Performed by: FAMILY MEDICINE

## 2023-11-13 PROCEDURE — 84153 ASSAY OF PSA TOTAL: CPT | Performed by: FAMILY MEDICINE

## 2023-11-13 RX ORDER — SILDENAFIL 50 MG/1
50 TABLET, FILM COATED ORAL
Qty: 30 TABLET | Refills: 1 | Status: SHIPPED | OUTPATIENT
Start: 2023-11-13

## 2023-11-13 RX ORDER — LISINOPRIL 10 MG/1
10 TABLET ORAL DAILY
Qty: 90 TABLET | Refills: 1 | Status: SHIPPED | OUTPATIENT
Start: 2023-11-13

## 2023-11-14 PROBLEM — N52.9 ERECTILE DYSFUNCTION: Status: ACTIVE | Noted: 2023-11-14

## 2023-11-14 PROBLEM — I10 ESSENTIAL HYPERTENSION, BENIGN: Status: ACTIVE | Noted: 2023-11-14

## 2023-11-14 LAB
EST. AVERAGE GLUCOSE BLD GHB EST-MCNC: 126 MG/DL (ref 68–126)
HBA1C MFR BLD: 6 % (ref ?–5.7)

## 2023-11-15 DIAGNOSIS — E78.5 HYPERLIPIDEMIA, UNSPECIFIED HYPERLIPIDEMIA TYPE: ICD-10-CM

## 2023-11-15 DIAGNOSIS — I10 ESSENTIAL HYPERTENSION, BENIGN: Primary | ICD-10-CM

## 2023-11-15 DIAGNOSIS — R73.03 PREDIABETES: ICD-10-CM

## 2024-07-02 ENCOUNTER — NURSE ONLY (OUTPATIENT)
Dept: FAMILY MEDICINE CLINIC | Facility: CLINIC | Age: 59
End: 2024-07-02
Payer: COMMERCIAL

## 2024-07-02 DIAGNOSIS — E78.5 HYPERLIPIDEMIA, UNSPECIFIED HYPERLIPIDEMIA TYPE: ICD-10-CM

## 2024-07-02 DIAGNOSIS — I10 ESSENTIAL HYPERTENSION, BENIGN: ICD-10-CM

## 2024-07-02 DIAGNOSIS — R73.03 PREDIABETES: ICD-10-CM

## 2024-07-02 LAB
ALBUMIN SERPL-MCNC: 3.9 G/DL (ref 3.4–5)
ALBUMIN/GLOB SERPL: 1.2 {RATIO} (ref 1–2)
ALP LIVER SERPL-CCNC: 62 U/L
ALT SERPL-CCNC: 47 U/L
ANION GAP SERPL CALC-SCNC: 7 MMOL/L (ref 0–18)
AST SERPL-CCNC: 16 U/L (ref 15–37)
BILIRUB SERPL-MCNC: 0.8 MG/DL (ref 0.1–2)
BUN BLD-MCNC: 15 MG/DL (ref 9–23)
CALCIUM BLD-MCNC: 9.1 MG/DL (ref 8.5–10.1)
CHLORIDE SERPL-SCNC: 107 MMOL/L (ref 98–112)
CHOLEST SERPL-MCNC: 217 MG/DL (ref ?–200)
CO2 SERPL-SCNC: 25 MMOL/L (ref 21–32)
CREAT BLD-MCNC: 0.88 MG/DL
EGFRCR SERPLBLD CKD-EPI 2021: 100 ML/MIN/1.73M2 (ref 60–?)
FASTING PATIENT LIPID ANSWER: YES
FASTING STATUS PATIENT QL REPORTED: YES
GLOBULIN PLAS-MCNC: 3.2 G/DL (ref 2.8–4.4)
GLUCOSE BLD-MCNC: 105 MG/DL (ref 70–99)
HDLC SERPL-MCNC: 61 MG/DL (ref 40–59)
LDLC SERPL CALC-MCNC: 143 MG/DL (ref ?–100)
NONHDLC SERPL-MCNC: 156 MG/DL (ref ?–130)
OSMOLALITY SERPL CALC.SUM OF ELEC: 289 MOSM/KG (ref 275–295)
POTASSIUM SERPL-SCNC: 3.8 MMOL/L (ref 3.5–5.1)
PROT SERPL-MCNC: 7.1 G/DL (ref 6.4–8.2)
SODIUM SERPL-SCNC: 139 MMOL/L (ref 136–145)
TRIGL SERPL-MCNC: 73 MG/DL (ref 30–149)
VLDLC SERPL CALC-MCNC: 14 MG/DL (ref 0–30)

## 2024-07-02 PROCEDURE — 80061 LIPID PANEL: CPT | Performed by: FAMILY MEDICINE

## 2024-07-02 PROCEDURE — 83036 HEMOGLOBIN GLYCOSYLATED A1C: CPT | Performed by: FAMILY MEDICINE

## 2024-07-02 PROCEDURE — 80053 COMPREHEN METABOLIC PANEL: CPT | Performed by: FAMILY MEDICINE

## 2024-07-02 NOTE — PROGRESS NOTES
Patient came in for draw of ordered fasting labs. Patient drawn out of right AC, x 1 attempt and tolerated well.  SST (mint green) and lavender tube drawn.

## 2024-07-03 LAB
EST. AVERAGE GLUCOSE BLD GHB EST-MCNC: 126 MG/DL (ref 68–126)
HBA1C MFR BLD: 6 % (ref ?–5.7)

## 2024-07-08 DIAGNOSIS — R73.03 PREDIABETES: ICD-10-CM

## 2024-07-08 DIAGNOSIS — N52.9 ERECTILE DYSFUNCTION, UNSPECIFIED ERECTILE DYSFUNCTION TYPE: ICD-10-CM

## 2024-07-08 DIAGNOSIS — I10 ESSENTIAL HYPERTENSION, BENIGN: ICD-10-CM

## 2024-07-08 NOTE — TELEPHONE ENCOUNTER
Patient needs a refill on three medications   Medication and dose:  Lisinopril 10 mg                                       Metformin 500                                       Sildenafil 50 mg                                          30 or 90 day supply:  90    Pharmacy: [facility]    Additional notes:  no    Your medication request will be sent to our clinical team.  If they have any questions they will call you. Patient expressed understanding.

## 2024-07-09 RX ORDER — SILDENAFIL 50 MG/1
50 TABLET, FILM COATED ORAL
Qty: 30 TABLET | Refills: 1 | Status: CANCELLED | OUTPATIENT
Start: 2024-07-09

## 2024-07-09 RX ORDER — LISINOPRIL 10 MG/1
10 TABLET ORAL DAILY
Qty: 90 TABLET | Refills: 1 | Status: CANCELLED | OUTPATIENT
Start: 2024-07-09

## 2024-07-09 NOTE — TELEPHONE ENCOUNTER
Patient is over due for 6 month follow up since 05/2024.     Outreach call to pt, pt scheduled for follow up visit tomorrow 3 pm. Ok to address refill request at appointment. Pt states he has a week left of medication

## 2024-07-10 ENCOUNTER — OFFICE VISIT (OUTPATIENT)
Dept: FAMILY MEDICINE CLINIC | Facility: CLINIC | Age: 59
End: 2024-07-10
Payer: COMMERCIAL

## 2024-07-10 VITALS
WEIGHT: 284.63 LBS | HEART RATE: 68 BPM | OXYGEN SATURATION: 90 % | DIASTOLIC BLOOD PRESSURE: 72 MMHG | BODY MASS INDEX: 38 KG/M2 | SYSTOLIC BLOOD PRESSURE: 116 MMHG | TEMPERATURE: 98 F | RESPIRATION RATE: 18 BRPM

## 2024-07-10 DIAGNOSIS — L03.90 CELLULITIS, UNSPECIFIED CELLULITIS SITE: ICD-10-CM

## 2024-07-10 DIAGNOSIS — Z13.228 SCREENING FOR ENDOCRINE, METABOLIC, AND IMMUNITY DISORDER: ICD-10-CM

## 2024-07-10 DIAGNOSIS — I10 ESSENTIAL HYPERTENSION, BENIGN: Primary | ICD-10-CM

## 2024-07-10 DIAGNOSIS — R73.03 PREDIABETES: ICD-10-CM

## 2024-07-10 DIAGNOSIS — E78.5 HYPERLIPIDEMIA, UNSPECIFIED HYPERLIPIDEMIA TYPE: ICD-10-CM

## 2024-07-10 DIAGNOSIS — N52.9 ERECTILE DYSFUNCTION, UNSPECIFIED ERECTILE DYSFUNCTION TYPE: ICD-10-CM

## 2024-07-10 DIAGNOSIS — L25.9 CONTACT DERMATITIS, UNSPECIFIED CONTACT DERMATITIS TYPE, UNSPECIFIED TRIGGER: ICD-10-CM

## 2024-07-10 DIAGNOSIS — Z13.0 SCREENING FOR ENDOCRINE, METABOLIC, AND IMMUNITY DISORDER: ICD-10-CM

## 2024-07-10 DIAGNOSIS — Z13.29 SCREENING FOR ENDOCRINE, METABOLIC, AND IMMUNITY DISORDER: ICD-10-CM

## 2024-07-10 PROCEDURE — 3078F DIAST BP <80 MM HG: CPT | Performed by: FAMILY MEDICINE

## 2024-07-10 PROCEDURE — 99214 OFFICE O/P EST MOD 30 MIN: CPT | Performed by: FAMILY MEDICINE

## 2024-07-10 PROCEDURE — 3074F SYST BP LT 130 MM HG: CPT | Performed by: FAMILY MEDICINE

## 2024-07-10 RX ORDER — TRIAMCINOLONE ACETONIDE 1 MG/G
CREAM TOPICAL 2 TIMES DAILY PRN
Qty: 60 G | Refills: 0 | Status: SHIPPED | OUTPATIENT
Start: 2024-07-10

## 2024-07-10 RX ORDER — LISINOPRIL 10 MG/1
10 TABLET ORAL DAILY
Qty: 90 TABLET | Refills: 1 | Status: SHIPPED | OUTPATIENT
Start: 2024-07-10

## 2024-07-10 RX ORDER — SILDENAFIL 50 MG/1
50 TABLET, FILM COATED ORAL
Qty: 30 TABLET | Refills: 3 | Status: SHIPPED | OUTPATIENT
Start: 2024-07-10

## 2024-07-10 NOTE — PROGRESS NOTES
CHIEF COMPLAINT:   Chief Complaint   Patient presents with    Follow - Up         HPI:     Christian Tomlinson is a 59 year old male presents for HTN, prediabetes, and ED f-u.Rash.    HTN f-u: Pt has been taking Lisinopril 10 mg daily, tolerating well. He has no dry cough. He has no HA, no CP, palpitations, and no leg swelling. He feels well.     Previous HPI from Pt was noted to have elevated BP again today- has been elevated on previous visits. He has not been successful with trying to manage with diet and lifestyle changes.  He has no HA, no CP, palpitations, no leg swelling     Prediabetes: Last A1c was 6.0 in 7/2024.  He has not been exercising but tries to remain active at work. He has no polyuria and no polydipsia.      Previous HPI: Last A1c was 6.1 in 4/2023.  He attributes this increased level to working again.  He was laid off x 3 months in the winter and was going on walks daily, but ever since being back to work since January, he snacks after work.He has no polyuria and no polydipsia. He is taking Metformin      Previous HPI: He is tolerating the Metformin well. He has been cutting back on portions, but still eating the same, regular diet. He has no polyuria and no polydipsia. He has also lost some weight ~8 lbs.     Previous HPI: Most recent A1c is 6.2 in 6/2022. He states he has not made any diet changes and is not exercising as he should be.  He has no polyuria and no polydipsia. FHx: mother and father with DM2.      HL: Has had elevated lipids in the past. Manages with diet and exercise.     Previous HPI: He was noted to have elevated lipids again on 6/2022 labs. Not following any healthy diet and does not workout regularly     ED: he is doing well with Sildenafil, tolerating well.     Previous HPI: Pt has had a problem with maintaining an erection for a few months now.  He is interested in trying Viagra. He has no history of MI or CAD.      Rash: pt has a camper has been spending more time outdoors  and has bug bites on his legs and in the inner elbow of his right arm.  Some itchiness, he scratches the ones on his leg and groin, states he has some weeping and crustiness when he scratches.  The one in his elbow does not crust over, but is more itchy and a larger red rash. He does not think he came into contact with poison ivy/oak/sumac.                             HISTORY:  Past Medical History:    Essential hypertension    Obesity    DELON (obstructive sleep apnea)    AHI 29 REM AHI 49 Supine AHI 46 non-supine AHI 23 Sao2 Bridger 70%     Sleep apnea      Past Surgical History:   Procedure Laterality Date    Colonoscopy  2019    Hernia surgery      Other surgical history      pilonidal cyst removal      Family History   Problem Relation Age of Onset    Diabetes Mother     Hypertension Mother     Heart Attack Father     Hypertension Father     Other (Other) Father         COPD      Social History:   Social History     Socioeconomic History    Marital status:    Tobacco Use    Smoking status: Former     Current packs/day: 0.00     Types: Cigarettes     Quit date: 3/26/2001     Years since quittin.3    Smokeless tobacco: Never   Vaping Use    Vaping status: Never Used   Substance and Sexual Activity    Alcohol use: Yes    Drug use: No   Other Topics Concern    Caffeine Concern Yes    Exercise Yes    Seat Belt Yes    Special Diet No    Stress Concern No    Weight Concern No     Social Determinants of Health      [facility]    Social Connections    [facility]    Housing Stability        Medications (Active prior to today's visit):  Current Outpatient Medications   Medication Sig Dispense Refill    lisinopril 10 MG Oral Tab Take 1 tablet (10 mg total) by mouth daily. 90 tablet 1    metFORMIN 500 MG Oral Tab Take 1 tablet (500 mg total) by mouth 2 (two) times daily with meals. 180  tablet 1    Sildenafil Citrate 50 MG Oral Tab Take 1 tablet (50 mg total) by mouth daily as needed for Erectile Dysfunction. 30 tablet 3    mupirocin 2 % External Ointment Apply 1 Application topically 2 (two) times daily. 22 g 0    triamcinolone 0.1 % External Cream Apply topically 2 (two) times daily as needed. 60 g 0       Allergies:  No Known Allergies    PSFH elements reviewed from today and agreed except as otherwise stated in HPI.  ROS:     Review of Systems   Constitutional:  Negative for appetite change, chills, fatigue and fever.   Cardiovascular:  Negative for chest pain, palpitations and leg swelling.   Gastrointestinal:  Negative for abdominal pain, nausea and vomiting.   Endocrine: Negative for polydipsia and polyuria.   Skin:  Positive for rash.         Pertinent positives and negatives noted in the the HPI.    PHYSICAL EXAM:   /72 (BP Location: Left arm, Patient Position: Sitting, Cuff Size: large)   Pulse 68   Temp 97.9 °F (36.6 °C) (Temporal)   Resp 18   Wt 284 lb 9.6 oz (129.1 kg)   SpO2 90%   BMI 37.76 kg/m²   Vital signs reviewed.Appears stated age, well groomed.  Physical Exam  Vitals reviewed.   Constitutional:       Appearance: Normal appearance.   HENT:      Head: Normocephalic.   Cardiovascular:      Rate and Rhythm: Normal rate and regular rhythm.      Pulses: Normal pulses.      Heart sounds: Normal heart sounds.   Pulmonary:      Effort: Pulmonary effort is normal.      Breath sounds: Normal breath sounds.   Musculoskeletal:      Right lower leg: No edema.      Left lower leg: No edema.   Skin:     General: Skin is warm and dry.      Findings: Erythema and rash present.      Comments: Has various erythematous denuded lesions of lower legs and in left groin area.  Right elbow with macularpapular rash with small blisters.    Neurological:      Mental Status: He is alert and oriented to person, place, and time.   Psychiatric:         Behavior: Behavior normal.          LABS      Nurse Only on 07/02/2024   Component Date Value    HgbA1C 07/02/2024 6.0 (H)     Estimated Average Glucose 07/02/2024 126     Glucose 07/02/2024 105 (H)     Sodium 07/02/2024 139     Potassium 07/02/2024 3.8     Chloride 07/02/2024 107     CO2 07/02/2024 25.0     Anion Gap 07/02/2024 7     BUN 07/02/2024 15     Creatinine 07/02/2024 0.88     Calcium, Total 07/02/2024 9.1     Calculated Osmolality 07/02/2024 289     eGFR-Cr 07/02/2024 100     AST 07/02/2024 16     ALT 07/02/2024 47     Alkaline Phosphatase 07/02/2024 62     Bilirubin, Total 07/02/2024 0.8     Total Protein 07/02/2024 7.1     Albumin 07/02/2024 3.9     Globulin  07/02/2024 3.2     A/G Ratio 07/02/2024 1.2     Patient Fasting for CMP? 07/02/2024 Yes     Cholesterol, Total 07/02/2024 217 (H)     HDL Cholesterol 07/02/2024 61 (H)     Triglycerides 07/02/2024 73     LDL Cholesterol 07/02/2024 143 (H)     VLDL 07/02/2024 14     Non HDL Chol 07/02/2024 156 (H)     Patient Fasting for Lipi* 07/02/2024 Yes       REVIEWED THIS VISIT  ASSESSMENT/PLAN:   59 year old male with    1. Essential hypertension, benign  - BP normal today  - 7/2024 BMP with normal lytes and Scr, due to recheck today  - continue Lisinopril 10 mg daily  - d/w pt a healthy, low-sodium diet, regular exercise, and wt loss  - RTC in 3-4 months for f-u     - Comp Metabolic Panel (14) [E]; Future  - Lipid Panel [E]; Future  - lisinopril 10 MG Oral Tab; Take 1 tablet (10 mg total) by mouth daily.  Dispense: 90 tablet; Refill: 1    2. Prediabetes  - 7/2024 A1c was 6.0, due to recheck  - pt is on Metformin 500 mg BIDWM, tolerating well; prefers to work on healthy diet/lifestyle changes instead of increasing dose of medication    - Hemoglobin A1C [E]; Future  - metFORMIN 500 MG Oral Tab; Take 1 tablet (500 mg total) by mouth 2 (two) times daily with meals.  Dispense: 180 tablet; Refill: 1    3. Hyperlipidemia, unspecified hyperlipidemia type  - cholesterol was elevated on previous labs  - 10 yr  ASCVD risk score previously was 7%  - if improved on recheck, ok to cont diet and lifestyle changes; if no improvement, then will start statin therap  - d/w pt a healthy, low-fat/low-cholesterol diet, regular exercise, and wt loss  - RTC in 3-4 months     - Comp Metabolic Panel (14) [E]; Future  - Lipid Panel [E]; Future    4. Erectile dysfunction, unspecified erectile dysfunction type  - stable on Sildenafil  - tolerating well  - Sildenafil Citrate 50 MG Oral Tab; Take 1 tablet (50 mg total) by mouth daily as needed for Erectile Dysfunction.  Dispense: 30 tablet; Refill: 3    5. Screening for endocrine, metabolic, and immunity disorder    - CBC W Differential W Platelet [E]; Future    6. Cellulitis, unspecified cellulitis site  - infected bug bites, possible impetigo  - cover with Bactroban. R/B/SE of new med d/w pt    - mupirocin 2 % External Ointment; Apply 1 Application topically 2 (two) times daily.  Dispense: 22 g; Refill: 0    7. Contact dermatitis, unspecified contact dermatitis type, unspecified trigger  - suspect right elbow has contact derm  - start Triamcinolone cream BID , R/B/SE of new med d/w pt    - triamcinolone 0.1 % External Cream; Apply topically 2 (two) times daily as needed.  Dispense: 60 g; Refill: 0          Meds This Visit:  Requested Prescriptions     Signed Prescriptions Disp Refills    lisinopril 10 MG Oral Tab 90 tablet 1     Sig: Take 1 tablet (10 mg total) by mouth daily.    metFORMIN 500 MG Oral Tab 180 tablet 1     Sig: Take 1 tablet (500 mg total) by mouth 2 (two) times daily with meals.    Sildenafil Citrate 50 MG Oral Tab 30 tablet 3     Sig: Take 1 tablet (50 mg total) by mouth daily as needed for Erectile Dysfunction.    mupirocin 2 % External Ointment 22 g 0     Sig: Apply 1 Application topically 2 (two) times daily.    triamcinolone 0.1 % External Cream 60 g 0     Sig: Apply topically 2 (two) times daily as needed.       Health Maintenance:  Health Maintenance   Topic Date Due     Zoster Vaccines (1 of 2) Never done    Colorectal Cancer Screening  09/27/2022    COVID-19 Vaccine (3 - 2023-24 season) 09/01/2023    Influenza Vaccine (1) 10/01/2024    Annual Physical  11/13/2024    PSA  11/13/2025    DTaP,Tdap,and Td Vaccines (2 - Td or Tdap) 10/31/2026    Annual Depression Screening  Completed    Pneumococcal Vaccine: Birth to 64yrs  Aged Out         Patient/Caregiver Education: There are no barriers to learning. Medical education done.   Outcome: Patient verbalizes understanding and agrees with plan. Advised to call or RTC if symptoms persist or worsen.    Problem List:     Patient Active Problem List   Diagnosis    Obesity (BMI 30-39.9)    GERD (gastroesophageal reflux disease)    Prediabetes    Hyperlipidemia    DELON (obstructive sleep apnea)    Nephrolithiasis    Essential hypertension, benign    Erectile dysfunction       Imaging & Referrals:  None     7/10/2024  Keily Desai MD      Patient understands plan and follow-up.  Return in about 4 months (around 11/10/2024) for annual physical will be due in 11/2024.

## 2024-11-13 ENCOUNTER — OFFICE VISIT (OUTPATIENT)
Dept: FAMILY MEDICINE CLINIC | Facility: CLINIC | Age: 59
End: 2024-11-13
Payer: COMMERCIAL

## 2024-11-13 VITALS
HEART RATE: 72 BPM | HEIGHT: 71.18 IN | DIASTOLIC BLOOD PRESSURE: 76 MMHG | TEMPERATURE: 98 F | RESPIRATION RATE: 18 BRPM | WEIGHT: 285.38 LBS | OXYGEN SATURATION: 95 % | BODY MASS INDEX: 39.51 KG/M2 | SYSTOLIC BLOOD PRESSURE: 120 MMHG

## 2024-11-13 DIAGNOSIS — Z13.29 SCREENING FOR ENDOCRINE, NUTRITIONAL, METABOLIC AND IMMUNITY DISORDER: ICD-10-CM

## 2024-11-13 DIAGNOSIS — E78.5 HYPERLIPIDEMIA, UNSPECIFIED HYPERLIPIDEMIA TYPE: ICD-10-CM

## 2024-11-13 DIAGNOSIS — Z23 NEED FOR VACCINATION: ICD-10-CM

## 2024-11-13 DIAGNOSIS — R73.03 PREDIABETES: ICD-10-CM

## 2024-11-13 DIAGNOSIS — I10 ESSENTIAL HYPERTENSION, BENIGN: ICD-10-CM

## 2024-11-13 DIAGNOSIS — Z13.228 SCREENING FOR ENDOCRINE, NUTRITIONAL, METABOLIC AND IMMUNITY DISORDER: ICD-10-CM

## 2024-11-13 DIAGNOSIS — Z13.0 SCREENING FOR ENDOCRINE, NUTRITIONAL, METABOLIC AND IMMUNITY DISORDER: ICD-10-CM

## 2024-11-13 DIAGNOSIS — Z12.11 SCREEN FOR COLON CANCER: ICD-10-CM

## 2024-11-13 DIAGNOSIS — N52.9 ERECTILE DYSFUNCTION, UNSPECIFIED ERECTILE DYSFUNCTION TYPE: ICD-10-CM

## 2024-11-13 DIAGNOSIS — Z13.21 SCREENING FOR ENDOCRINE, NUTRITIONAL, METABOLIC AND IMMUNITY DISORDER: ICD-10-CM

## 2024-11-13 DIAGNOSIS — Z00.00 ANNUAL PHYSICAL EXAM: Primary | ICD-10-CM

## 2024-11-13 LAB
ALBUMIN SERPL-MCNC: 4.4 G/DL (ref 3.2–4.8)
ALBUMIN/GLOB SERPL: 1.5 {RATIO} (ref 1–2)
ALP LIVER SERPL-CCNC: 58 U/L
ALT SERPL-CCNC: 34 U/L
ANION GAP SERPL CALC-SCNC: 3 MMOL/L (ref 0–18)
AST SERPL-CCNC: 24 U/L (ref ?–34)
BASOPHILS # BLD AUTO: 0.04 X10(3) UL (ref 0–0.2)
BASOPHILS NFR BLD AUTO: 0.5 %
BILIRUB SERPL-MCNC: 0.8 MG/DL (ref 0.3–1.2)
BUN BLD-MCNC: 12 MG/DL (ref 9–23)
CALCIUM BLD-MCNC: 10.1 MG/DL (ref 8.7–10.4)
CHLORIDE SERPL-SCNC: 103 MMOL/L (ref 98–112)
CHOLEST SERPL-MCNC: 177 MG/DL (ref ?–200)
CO2 SERPL-SCNC: 32 MMOL/L (ref 21–32)
CREAT BLD-MCNC: 0.84 MG/DL
EGFRCR SERPLBLD CKD-EPI 2021: 102 ML/MIN/1.73M2 (ref 60–?)
EOSINOPHIL # BLD AUTO: 0.18 X10(3) UL (ref 0–0.7)
EOSINOPHIL NFR BLD AUTO: 2.3 %
ERYTHROCYTE [DISTWIDTH] IN BLOOD BY AUTOMATED COUNT: 12.5 %
EST. AVERAGE GLUCOSE BLD GHB EST-MCNC: 128 MG/DL (ref 68–126)
FASTING PATIENT LIPID ANSWER: YES
FASTING STATUS PATIENT QL REPORTED: YES
GLOBULIN PLAS-MCNC: 3 G/DL (ref 2–3.5)
GLUCOSE BLD-MCNC: 95 MG/DL (ref 70–99)
HBA1C MFR BLD: 6.1 % (ref ?–5.7)
HCT VFR BLD AUTO: 43.7 %
HDLC SERPL-MCNC: 55 MG/DL (ref 40–59)
HGB BLD-MCNC: 14.8 G/DL
IMM GRANULOCYTES # BLD AUTO: 0.01 X10(3) UL (ref 0–1)
IMM GRANULOCYTES NFR BLD: 0.1 %
LDLC SERPL CALC-MCNC: 110 MG/DL (ref ?–100)
LYMPHOCYTES # BLD AUTO: 2.17 X10(3) UL (ref 1–4)
LYMPHOCYTES NFR BLD AUTO: 27.6 %
MCH RBC QN AUTO: 30 PG (ref 26–34)
MCHC RBC AUTO-ENTMCNC: 33.9 G/DL (ref 31–37)
MCV RBC AUTO: 88.5 FL
MONOCYTES # BLD AUTO: 0.51 X10(3) UL (ref 0.1–1)
MONOCYTES NFR BLD AUTO: 6.5 %
NEUTROPHILS # BLD AUTO: 4.95 X10 (3) UL (ref 1.5–7.7)
NEUTROPHILS # BLD AUTO: 4.95 X10(3) UL (ref 1.5–7.7)
NEUTROPHILS NFR BLD AUTO: 63 %
NONHDLC SERPL-MCNC: 122 MG/DL (ref ?–130)
OSMOLALITY SERPL CALC.SUM OF ELEC: 286 MOSM/KG (ref 275–295)
PLATELET # BLD AUTO: 207 10(3)UL (ref 150–450)
POTASSIUM SERPL-SCNC: 4.2 MMOL/L (ref 3.5–5.1)
PROT SERPL-MCNC: 7.4 G/DL (ref 5.7–8.2)
RBC # BLD AUTO: 4.94 X10(6)UL
SODIUM SERPL-SCNC: 138 MMOL/L (ref 136–145)
TRIGL SERPL-MCNC: 64 MG/DL (ref 30–149)
TSI SER-ACNC: 2.51 UIU/ML (ref 0.55–4.78)
VLDLC SERPL CALC-MCNC: 11 MG/DL (ref 0–30)
WBC # BLD AUTO: 7.9 X10(3) UL (ref 4–11)

## 2024-11-13 PROCEDURE — 80061 LIPID PANEL: CPT | Performed by: FAMILY MEDICINE

## 2024-11-13 PROCEDURE — 83036 HEMOGLOBIN GLYCOSYLATED A1C: CPT | Performed by: FAMILY MEDICINE

## 2024-11-13 PROCEDURE — 99396 PREV VISIT EST AGE 40-64: CPT | Performed by: FAMILY MEDICINE

## 2024-11-13 PROCEDURE — 3078F DIAST BP <80 MM HG: CPT | Performed by: FAMILY MEDICINE

## 2024-11-13 PROCEDURE — 80050 GENERAL HEALTH PANEL: CPT | Performed by: FAMILY MEDICINE

## 2024-11-13 PROCEDURE — 3008F BODY MASS INDEX DOCD: CPT | Performed by: FAMILY MEDICINE

## 2024-11-13 PROCEDURE — 36415 COLL VENOUS BLD VENIPUNCTURE: CPT | Performed by: FAMILY MEDICINE

## 2024-11-13 PROCEDURE — 3074F SYST BP LT 130 MM HG: CPT | Performed by: FAMILY MEDICINE

## 2024-11-13 RX ORDER — SILDENAFIL 50 MG/1
50 TABLET, FILM COATED ORAL
Qty: 30 TABLET | Refills: 6 | Status: SHIPPED | OUTPATIENT
Start: 2024-11-13

## 2024-11-13 NOTE — PROGRESS NOTES
Chief Complaint   Patient presents with    Physical     Annual exam        HPI:   Christian Tomlinson is a 59 year old male who presents for a complete physical exam.   Patient is present for complete physical. Feels very well. Up to date with dental visits.No hearing problems. Vaccinations: up to date.      Annual physical:  Overall, pt states he feels well.     Sexually active: monogamous  Last PSA: 11/2023, normal (rpt Q2 yrs)  Last Colon Ca screening: colonoscopy overdue, sees Dr Starks    Exercise: walking at work, a lot of climbing/active at work  Diet: regular     HTN f-u: Pt has been taking Lisinopril 10 mg daily, tolerating well. He has no dry cough. He has no HA, no CP, palpitations, and no leg swelling. He feels well.     Previous HPI from Pt was noted to have elevated BP again today- has been elevated on previous visits. He has not been successful with trying to manage with diet and lifestyle changes.  He has no HA, no CP, palpitations, no leg swelling     Prediabetes: Last A1c was 6.0 in 7/2024.  He has not been exercising but tries to remain active at work. He has no polyuria and no polydipsia.      Previous HPI: Last A1c was 6.1 in 4/2023.  He attributes this increased level to working again.  He was laid off x 3 months in the winter and was going on walks daily, but ever since being back to work since January, he snacks after work.He has no polyuria and no polydipsia. He is taking Metformin      Previous HPI: He is tolerating the Metformin well. He has been cutting back on portions, but still eating the same, regular diet. He has no polyuria and no polydipsia. He has also lost some weight ~8 lbs.     Previous HPI: Most recent A1c is 6.2 in 6/2022. He states he has not made any diet changes and is not exercising as he should be.  He has no polyuria and no polydipsia. FHx: mother and father with DM2.      HL: Has had elevated lipids in the past. Manages with diet and exercise.     Previous HPI: He was  noted to have elevated lipids again on 6/2022 labs. Not following any healthy diet and does not workout regularly     ED: he is doing well with Sildenafil, tolerating well.     Previous HPI: Pt has had a problem with maintaining an erection for a few months now.  He is interested in trying Viagra. He has no history of MI or CAD.                   Wt Readings from Last 3 Encounters:   11/13/24 285 lb 6.4 oz (129.5 kg)   07/10/24 284 lb 9.6 oz (129.1 kg)   11/13/23 289 lb 6.4 oz (131.3 kg)      BP Readings from Last 3 Encounters:   11/13/24 120/76   07/10/24 116/72   11/13/23 136/74       Cholesterol, Total (mg/dL)   Date Value   11/13/2024 177   07/02/2024 217 (H)   11/13/2023 198     HDL Cholesterol (mg/dL)   Date Value   11/13/2024 55   07/02/2024 61 (H)   11/13/2023 55     LDL Cholesterol (mg/dL)   Date Value   11/13/2024 110 (H)   07/02/2024 143 (H)   11/13/2023 132 (H)     AST (U/L)   Date Value   11/13/2024 24   07/02/2024 16   11/13/2023 17     ALT (U/L)   Date Value   11/13/2024 34   07/02/2024 47   11/13/2023 35      Current Outpatient Medications   Medication Sig Dispense Refill    Sildenafil Citrate 50 MG Oral Tab Take 1 tablet (50 mg total) by mouth daily as needed for Erectile Dysfunction. 30 tablet 6    lisinopril 10 MG Oral Tab Take 1 tablet (10 mg total) by mouth daily. 90 tablet 1    metFORMIN 500 MG Oral Tab Take 1 tablet (500 mg total) by mouth 2 (two) times daily with meals. 180 tablet 1    mupirocin 2 % External Ointment Apply 1 Application topically 2 (two) times daily. 22 g 0    triamcinolone 0.1 % External Cream Apply topically 2 (two) times daily as needed. 60 g 0      Past Medical History:    Essential hypertension    Obesity    DELON (obstructive sleep apnea)    AHI 29 REM AHI 49 Supine AHI 46 non-supine AHI 23 Sao2 Bridger 70%     Sleep apnea      Past Surgical History:   Procedure Laterality Date    Colonoscopy  2019    Hernia surgery      Other surgical history      pilonidal cyst removal       Family History   Problem Relation Age of Onset    Diabetes Mother     Hypertension Mother     Heart Attack Father     Hypertension Father     Other (Other) Father         COPD      Social History     Socioeconomic History    Marital status:    Tobacco Use    Smoking status: Former     Current packs/day: 0.00     Types: Cigarettes     Quit date: 3/26/2001     Years since quittin.6    Smokeless tobacco: Never   Vaping Use    Vaping status: Never Used   Substance and Sexual Activity    Alcohol use: Yes    Drug use: No   Other Topics Concern    Caffeine Concern Yes    Exercise Yes    Seat Belt Yes    Special Diet No    Stress Concern No    Weight Concern No     Social Drivers of Health      [facility]    Social Connections    [facility] from [facility]    Housing Stability     Exercise:  active at work .  Diet:  regular   Allergies:  Allergies[1]     REVIEW OF SYSTEMS:     Review of Systems   Constitutional:  Negative for appetite change, fatigue and unexpected weight change.   Cardiovascular:  Negative for chest pain, palpitations and leg swelling.   Gastrointestinal:  Negative for abdominal pain, constipation, diarrhea, nausea and vomiting.   Genitourinary:  Negative for dysuria.   Neurological:  Negative for dizziness and headaches.        EXAM:   /76 (BP Location: Left arm, Patient Position: Sitting, Cuff Size: large)   Pulse 72   Temp 98 °F (36.7 °C) (Temporal)   Resp 18   Ht 5' 11.18\" (1.808 m)   Wt 285 lb 6.4 oz (129.5 kg)   SpO2 95%   BMI 39.60 kg/m²   GENERAL: WD/WN in no acute distress.   HEENT: PERRLA and EOMI.  OP moist no lesions. TM normal, canals normal.  NECK: is supple,thyroid- normal.  LUNGS: are clear to auscultation bilaterally, with no wheeze, rhonchi, or rales.  HEART: is RRR.  S1, S2, with no murmurs.    ABDOMEN: is soft, NT/ND with no HSM.  No rebound or  guarding, NABS.     EXAM: deferred  RECTAL EXAM: deferred  EXTREMITIES: are symmetric with no cyanosis, clubbing, or edema.    SKIN: is unremarkable without rashes.    NEURO: no focal abnormalities, and reflexes coordination and gait normal and symmetric.   MUSK/SKEL: Normal muscles tones, no joint abnormalities, full ROM of all extremities.    back- normal curves, no scoliosis, normal gait,  No joint or muscle abnormalities.  PSYCH: pt is alert, oriented x 3, normal affect.    ASSESSMENT AND PLAN:   Christian Tomlinson is a 59 year old male who presents for a complete physical exam.     1. Annual physical exam  Routine labs ordered today, await results. Counseled pt on healthy lifestyle changes. Vaccines today: declines Flu and SHIngles vaccines  .     Last PSA: 11/2023, normal (rpt Q2 yrs)  Last Colon Ca screening: colonoscopy overdue, sees Dr Starks    - CBC With Differential With Platelet; Future  - TSH W Reflex To Free T4; Future  - CBC With Differential With Platelet  - TSH W Reflex To Free T4    2. Essential hypertension, benign  - BP normal today  - 7/2024 BMP with normal lytes and Scr, due to recheck today  - continue Lisinopril 10 mg daily  - d/w pt a healthy, low-sodium diet, regular exercise, and wt loss  - RTC in 6 months for f-u    - Comp Metabolic Panel; Future  - Lipid Panel; Future  - Comp Metabolic Panel  - Lipid Panel    3. Prediabetes  - 7/2024 A1c was 6.0, due to recheck  - pt is on Metformin 500 mg BIDWM, tolerating well; prefers to work on healthy diet/lifestyle changes instead of increasing dose of medication    - Hemoglobin A1C [E]; Future  - Hemoglobin A1C [E]    4. Erectile dysfunction, unspecified erectile dysfunction type  - stable on Sildenafil, working well    - Sildenafil Citrate 50 MG Oral Tab; Take 1 tablet (50 mg total) by mouth daily as needed for Erectile Dysfunction.  Dispense: 30 tablet; Refill: 6    5. Hyperlipidemia, unspecified hyperlipidemia type  - cholesterol was elevated  on previous labs  - 10 yr ASCVD risk score previously was 7%  - if improved on recheck, ok to cont diet and lifestyle changes; if no improvement, then will start statin therap  - d/w pt a healthy, low-fat/low-cholesterol diet, regular exercise, and wt loss  - RTC in 6 months     - Comp Metabolic Panel; Future  - Lipid Panel; Future  - Comp Metabolic Panel  - Lipid Panel    6. Screen for colon cancer    - GASTRO - INTERNAL    7. Need for vaccination    - INFLUENZA REFUSED EE    8. Screening for endocrine, nutritional, metabolic and immunity disorder    - CBC With Differential With Platelet; Future  - Comp Metabolic Panel; Future  - TSH W Reflex To Free T4; Future  - CBC With Differential With Platelet  - Comp Metabolic Panel  - TSH W Reflex To Free T4      Pt's weight is Body mass index is 39.6 kg/m²., recommended low fat diet and aerobic exercise 30 minutes three times weekly.   The patient indicates understanding of these issues and agrees to the plan.    Return in about 6 months (around 5/13/2025) for prediabetes, HTN, medication follow-up.           [1] No Known Allergies

## 2024-11-15 DIAGNOSIS — E78.5 HYPERLIPIDEMIA, UNSPECIFIED HYPERLIPIDEMIA TYPE: ICD-10-CM

## 2024-11-15 DIAGNOSIS — I10 ESSENTIAL HYPERTENSION, BENIGN: Primary | ICD-10-CM

## 2024-11-15 DIAGNOSIS — R73.03 PREDIABETES: ICD-10-CM

## 2024-12-30 DIAGNOSIS — R73.03 PREDIABETES: Primary | ICD-10-CM

## 2024-12-30 DIAGNOSIS — I10 ESSENTIAL HYPERTENSION, BENIGN: ICD-10-CM

## 2024-12-30 RX ORDER — LISINOPRIL 10 MG/1
10 TABLET ORAL DAILY
Qty: 90 TABLET | Refills: 0 | Status: SHIPPED | OUTPATIENT
Start: 2024-12-30

## 2024-12-30 NOTE — TELEPHONE ENCOUNTER
Physical exam on 11/13/24 with   CMP on 11/13/24 normal  Approve/ deny Lisinopril  Approve/ deny Metformin

## 2024-12-30 NOTE — TELEPHONE ENCOUNTER
Medication and dose requested: Pt needs refills on lisinopril and metformin    Pharmacy name/location:      [facility] - [place], [place] - [facility] [phone], [phone]  [address] [place] [id number]  Phone: [phone] Fax: [phone]       Additional notes:

## 2025-01-07 ENCOUNTER — TELEPHONE (OUTPATIENT)
Dept: FAMILY MEDICINE CLINIC | Facility: CLINIC | Age: 60
End: 2025-01-07

## 2025-01-07 DIAGNOSIS — I10 ESSENTIAL HYPERTENSION, BENIGN: ICD-10-CM

## 2025-01-08 RX ORDER — LISINOPRIL 10 MG/1
10 TABLET ORAL DAILY
Qty: 90 TABLET | Refills: 0 | OUTPATIENT
Start: 2025-01-08

## 2025-01-08 RX ORDER — LISINOPRIL 10 MG/1
10 TABLET ORAL DAILY
Qty: 7 TABLET | Refills: 0 | Status: SHIPPED | OUTPATIENT
Start: 2025-01-08

## 2025-01-08 NOTE — TELEPHONE ENCOUNTER
S/w pt on below. Reports they shipped him 2 rx's for metformin but not his lisinopril. Error on mail order side. They are correcting but running low on rx. Asks for 7 day to blair while awaiting mail order.   RX sent

## 2025-01-08 NOTE — TELEPHONE ENCOUNTER
Refill requested too soon:      Pt requesting refill of   Requested Prescriptions             Pending Prescriptions Disp Refills    LISINOPRIL 10 MG Oral Tab [Pharmacy Med Name: Lisinopril Oral Tablet 10 MG 10 MG  Tablet] 90 tablet 0       Sig: TAKE ONE TABLET (10 MG TOTAL) BY MOUTH DAILY.       Last Time Medication was Filled:  12/30/2024 90 days 0 refills     Refused medication since patient has requested refill too soon.

## 2025-01-08 NOTE — TELEPHONE ENCOUNTER
Patient calling said that they messed up on his medication he got double of metformin and now is out of lisinopril can he get 7 days worth until they send him the correct amount.

## 2025-04-03 DIAGNOSIS — I10 ESSENTIAL HYPERTENSION, BENIGN: ICD-10-CM

## 2025-04-03 DIAGNOSIS — R73.03 PREDIABETES: ICD-10-CM

## 2025-04-03 RX ORDER — LISINOPRIL 10 MG/1
10 TABLET ORAL DAILY
Qty: 90 TABLET | Refills: 0 | Status: SHIPPED | OUTPATIENT
Start: 2025-04-03

## 2025-04-03 NOTE — TELEPHONE ENCOUNTER
Medication and dose requested: Metformin,Lisinopril    Pharmacy name/location:      [facility] - [place], [place] - [address] [phone], [phone]  [facility] [address]  [facility] [address]  Phone: [phone] Fax: [phone]       Additional notes:   Pt has 10 days left of medication  
Refill(s) Requested:   Requested Prescriptions     Pending Prescriptions Disp Refills    lisinopril 10 MG Oral Tab 90 tablet 0     Sig: Take 1 tablet (10 mg total) by mouth daily.    metFORMIN 500 MG Oral Tab 180 tablet 0     Sig: Take 1 tablet (500 mg total) by mouth 2 (two) times daily with meals.     Medication Last Prescribed:    Lisinopril 10 mg 7/10/2024   Metformin 500 mg 7/10/2024     Has dose or medication been changed    since last prescription? *Review notes*    []  Yes       [x]  No     Last office visit: 11/13/2024 (in-office)  Visit date not found (virtual visit)     Relevant details from LOV note: Essential hypertension, benign  - BP normal today  - 7/2024 BMP with normal lytes and Scr, due to recheck today  - continue Lisinopril 10 mg daily  - d/w pt a healthy, low-sodium diet, regular exercise, and wt loss  - RTC in 6 months for f-u   Prediabetes  - 7/2024 A1c was 6.0, due to recheck  - pt is on Metformin 500 mg BIDWM, tolerating well; prefers to work on healthy diet/lifestyle changes instead of increasing dose of medication    Relevant lab results:   Lab Results   Component Value Date     (H) 11/13/2024    A1C 6.1 (H) 11/13/2024      Patient was asked to follow-up in: Return in about 6 months (around 5/13/2025) for prediabetes, HTN, medication follow-up.     Appointment due: May 2025    Future Appointments: Visit date not found     Action taken:  [x] Medication refilled per protocol  
show

## 2025-06-18 ENCOUNTER — OFFICE VISIT (OUTPATIENT)
Dept: FAMILY MEDICINE CLINIC | Facility: CLINIC | Age: 60
End: 2025-06-18
Payer: COMMERCIAL

## 2025-06-18 VITALS
OXYGEN SATURATION: 94 % | BODY MASS INDEX: 39.64 KG/M2 | WEIGHT: 286.31 LBS | TEMPERATURE: 97 F | SYSTOLIC BLOOD PRESSURE: 130 MMHG | RESPIRATION RATE: 18 BRPM | HEIGHT: 71.18 IN | HEART RATE: 78 BPM | DIASTOLIC BLOOD PRESSURE: 76 MMHG

## 2025-06-18 DIAGNOSIS — B35.1 NAIL FUNGUS: ICD-10-CM

## 2025-06-18 DIAGNOSIS — E78.5 HYPERLIPIDEMIA, UNSPECIFIED HYPERLIPIDEMIA TYPE: ICD-10-CM

## 2025-06-18 DIAGNOSIS — N52.9 ERECTILE DYSFUNCTION, UNSPECIFIED ERECTILE DYSFUNCTION TYPE: ICD-10-CM

## 2025-06-18 DIAGNOSIS — I10 ESSENTIAL HYPERTENSION, BENIGN: Primary | ICD-10-CM

## 2025-06-18 DIAGNOSIS — R73.03 PREDIABETES: ICD-10-CM

## 2025-06-18 LAB
ALBUMIN SERPL-MCNC: 4.6 G/DL (ref 3.2–4.8)
ALBUMIN/GLOB SERPL: 2.1 {RATIO} (ref 1–2)
ALP LIVER SERPL-CCNC: 68 U/L (ref 45–117)
ALT SERPL-CCNC: 36 U/L (ref 10–49)
ANION GAP SERPL CALC-SCNC: 11 MMOL/L (ref 0–18)
AST SERPL-CCNC: 24 U/L (ref ?–34)
BILIRUB SERPL-MCNC: 0.6 MG/DL (ref 0.3–1.2)
BUN BLD-MCNC: 18 MG/DL (ref 9–23)
CALCIUM BLD-MCNC: 9.3 MG/DL (ref 8.7–10.6)
CHLORIDE SERPL-SCNC: 107 MMOL/L (ref 98–112)
CHOLEST SERPL-MCNC: 188 MG/DL (ref ?–200)
CO2 SERPL-SCNC: 25 MMOL/L (ref 21–32)
CREAT BLD-MCNC: 0.93 MG/DL (ref 0.7–1.3)
EGFRCR SERPLBLD CKD-EPI 2021: 95 ML/MIN/1.73M2 (ref 60–?)
EST. AVERAGE GLUCOSE BLD GHB EST-MCNC: 126 MG/DL (ref 68–126)
FASTING PATIENT LIPID ANSWER: YES
FASTING STATUS PATIENT QL REPORTED: YES
GLOBULIN PLAS-MCNC: 2.2 G/DL (ref 2–3.5)
GLUCOSE BLD-MCNC: 124 MG/DL (ref 70–99)
HBA1C MFR BLD: 6 % (ref ?–5.7)
HDLC SERPL-MCNC: 63 MG/DL (ref 40–59)
LDLC SERPL CALC-MCNC: 107 MG/DL (ref ?–100)
NONHDLC SERPL-MCNC: 125 MG/DL (ref ?–130)
OSMOLALITY SERPL CALC.SUM OF ELEC: 299 MOSM/KG (ref 275–295)
POTASSIUM SERPL-SCNC: 3.9 MMOL/L (ref 3.5–5.1)
PROT SERPL-MCNC: 6.8 G/DL (ref 5.7–8.2)
SODIUM SERPL-SCNC: 143 MMOL/L (ref 136–145)
TRIGL SERPL-MCNC: 99 MG/DL (ref 30–149)
VLDLC SERPL CALC-MCNC: 17 MG/DL (ref 0–30)

## 2025-06-18 PROCEDURE — 83036 HEMOGLOBIN GLYCOSYLATED A1C: CPT | Performed by: FAMILY MEDICINE

## 2025-06-18 PROCEDURE — G2211 COMPLEX E/M VISIT ADD ON: HCPCS | Performed by: FAMILY MEDICINE

## 2025-06-18 PROCEDURE — 80061 LIPID PANEL: CPT | Performed by: FAMILY MEDICINE

## 2025-06-18 PROCEDURE — 3075F SYST BP GE 130 - 139MM HG: CPT | Performed by: FAMILY MEDICINE

## 2025-06-18 PROCEDURE — 80053 COMPREHEN METABOLIC PANEL: CPT | Performed by: FAMILY MEDICINE

## 2025-06-18 PROCEDURE — 99214 OFFICE O/P EST MOD 30 MIN: CPT | Performed by: FAMILY MEDICINE

## 2025-06-18 PROCEDURE — 3008F BODY MASS INDEX DOCD: CPT | Performed by: FAMILY MEDICINE

## 2025-06-18 PROCEDURE — 3078F DIAST BP <80 MM HG: CPT | Performed by: FAMILY MEDICINE

## 2025-06-18 RX ORDER — TADALAFIL 10 MG/1
TABLET ORAL
Qty: 30 TABLET | Refills: 6 | Status: SHIPPED | OUTPATIENT
Start: 2025-06-18

## 2025-06-18 RX ORDER — LISINOPRIL 10 MG/1
10 TABLET ORAL DAILY
Qty: 90 TABLET | Refills: 1 | Status: SHIPPED | OUTPATIENT
Start: 2025-06-18

## 2025-06-18 NOTE — PROGRESS NOTES
CHIEF COMPLAINT:   Chief Complaint   Patient presents with    Medication Follow-Up         HPI:     Christian Tomlinson is a 60 year old male presents for HTN, prediabetes, HL, ED f-u.    HPI  History of Present Illness    ED f-u: He experiences nasal congestion, particularly in the right nostril, after taking sildenafil. The congestion occurs in the morning, persists throughout the day, and resolves by the next day. He associates this symptom with the medication as it does not occur otherwise.    Previous HPI:he is doing well with Sildenafil, tolerating well.     Previous HPI: Pt has had a problem with maintaining an erection for a few months now.  He is interested in trying Viagra. He has no history of MI or CAD.       Prediabetes f-u: he is currently taking metformin twice daily and lisinopril as prescribed for prediabetes and hypertension, respectively. Last HgbA1c was 6.1 in 11/2024.    Previous HPI: Last A1c was 6.0 in 7/2024.  He has not been exercising but tries to remain active at work. He has no polyuria and no polydipsia.      Previous HPI: Last A1c was 6.1 in 4/2023.  He attributes this increased level to working again.  He was laid off x 3 months in the winter and was going on walks daily, but ever since being back to work since January, he snacks after work.He has no polyuria and no polydipsia. He is taking Metformin      Previous HPI: He is tolerating the Metformin well. He has been cutting back on portions, but still eating the same, regular diet. He has no polyuria and no polydipsia. He has also lost some weight ~8 lbs.     Previous HPI: Most recent A1c is 6.2 in 6/2022. He states he has not made any diet changes and is not exercising as he should be.  He has no polyuria and no polydipsia. FHx: mother and father with DM2.     Nail problem: He has a concern about a nail infection, describing it as having a 'big ridge' and being 'kind of dark'. He recalls possibly injuring it a long time ago and is  worried about it cracking and splitting. He is considering treatment options but is concerned about the impact on his liver.      HTN f-u: Pt has been taking Lisinopril 10 mg daily, tolerating well. He has no dry cough. He has no HA, no CP, palpitations, and no leg swelling. He feels well.     Previous HPI from Pt was noted to have elevated BP again today- has been elevated on previous visits. He has not been successful with trying to manage with diet and lifestyle changes.  He has no HA, no CP, palpitations, no leg swelling     HL: Has had elevated lipids in the past. Manages with diet and exercise.     Previous HPI: He was noted to have elevated lipids again on 6/2022 labs. Not following any healthy diet and does not workout regularly    HISTORY:  Past Medical History[1]   Past Surgical History[2]   Family History[3]   Social History: Short Social Hx on File[4]     Medications (Active prior to today's visit):  Current Medications[5]    Allergies:  Allergies[6]    PSFH elements reviewed from today and agreed except as otherwise stated in HPI.  ROS:     Review of Systems      Pertinent positives and negatives noted in the the HPI.    PHYSICAL EXAM:   /76 (BP Location: Left arm, Patient Position: Sitting, Cuff Size: large)   Pulse 78   Temp 97.3 °F (36.3 °C) (Temporal)   Resp 18   Ht 5' 11.18\" (1.808 m)   Wt 286 lb 4.8 oz (129.9 kg)   SpO2 94%   BMI 39.73 kg/m²   Vital signs reviewed.Appears stated age, well groomed.  Physical Exam     LABS     No visits with results within 2 Month(s) from this visit.   Latest known visit with results is:   Office Visit on 11/13/2024   Component Date Value    WBC 11/13/2024 7.9     RBC 11/13/2024 4.94     HGB 11/13/2024 14.8     HCT 11/13/2024 43.7     PLT 11/13/2024 207.0     MCV 11/13/2024 88.5     MCH 11/13/2024 30.0     MCHC 11/13/2024 33.9     RDW 11/13/2024 12.5     Neutrophil Absolute Prel* 11/13/2024 4.95     Neutrophil Absolute 11/13/2024 4.95     Lymphocyte  Absolute 11/13/2024 2.17     Monocyte Absolute 11/13/2024 0.51     Eosinophil Absolute 11/13/2024 0.18     Basophil Absolute 11/13/2024 0.04     Immature Granulocyte Abs* 11/13/2024 0.01     Neutrophil % 11/13/2024 63.0     Lymphocyte % 11/13/2024 27.6     Monocyte % 11/13/2024 6.5     Eosinophil % 11/13/2024 2.3     Basophil % 11/13/2024 0.5     Immature Granulocyte % 11/13/2024 0.1     Glucose 11/13/2024 95     Sodium 11/13/2024 138     Potassium 11/13/2024 4.2     Chloride 11/13/2024 103     CO2 11/13/2024 32.0     Anion Gap 11/13/2024 3     BUN 11/13/2024 12     Creatinine 11/13/2024 0.84     Calcium, Total 11/13/2024 10.1     Calculated Osmolality 11/13/2024 286     eGFR-Cr 11/13/2024 102     AST 11/13/2024 24     ALT 11/13/2024 34     Alkaline Phosphatase 11/13/2024 58     Bilirubin, Total 11/13/2024 0.8     Total Protein 11/13/2024 7.4     Albumin 11/13/2024 4.4     Globulin  11/13/2024 3.0     A/G Ratio 11/13/2024 1.5     Patient Fasting for CMP? 11/13/2024 Yes     Cholesterol, Total 11/13/2024 177     HDL Cholesterol 11/13/2024 55     Triglycerides 11/13/2024 64     LDL Cholesterol 11/13/2024 110 (H)     VLDL 11/13/2024 11     Non HDL Chol 11/13/2024 122     Patient Fasting for Lipi* 11/13/2024 Yes     TSH 11/13/2024 2.506     HgbA1C 11/13/2024 6.1 (H)     Estimated Average Glucose 11/13/2024 128 (H)       REVIEWED THIS VISIT  ASSESSMENT/PLAN:   60 year old male with    1. Essential hypertension, benign  - BP normal today  - 11/2024 BMP with normal lytes and Scr, due to recheck today  - continue Lisinopril 10 mg daily  - d/w pt a healthy, low-sodium diet, regular exercise, and wt loss  - RTC in 6 months for f-u     - lisinopril 10 MG Oral Tab; Take 1 tablet (10 mg total) by mouth daily.  Dispense: 90 tablet; Refill: 1  - Comp Metabolic Panel (14) [E]  - Lipid Panel [E]  - *Venipuncture    2. Prediabetes  - 11/2024 A1c was 6.1, due to recheck  - pt is on Metformin 500 mg BIDWM, tolerating well  - cont low  carb/low sugar diet, regular exercise     - metFORMIN 500 MG Oral Tab; Take 1 tablet (500 mg total) by mouth 2 (two) times daily with meals.  Dispense: 180 tablet; Refill: 1  - Hemoglobin A1C [E]  - *Venipuncture    3. Hyperlipidemia, unspecified hyperlipidemia type  - cholesterol was elevated on previous labs  - 10 yr ASCVD risk score previously was 7%  - if improved on recheck, ok to cont diet and lifestyle changes; if no improvement, then will start statin therap  - d/w pt a healthy, low-fat/low-cholesterol diet, regular exercise, and wt loss  - RTC in 6 months     - Comp Metabolic Panel (14) [E]  - Lipid Panel [E]  - *Venipuncture    4. Erectile dysfunction, unspecified erectile dysfunction type  - now having SE from Sildenafil.    - start trial of Tadalafil, R/B/SE of new med d/w pt    - Tadalafil 10 MG Oral Tab; Take 1-2 tab PO daily as needed.  Dispense: 30 tablet; Refill: 6    5. Nail fungus  - will cont to observe, is on fingernail     The patient and provider have a longitudinal relationship to address/treat the serious or   complex condition(s) as stated in this encounter.       Meds This Visit:  Requested Prescriptions     Signed Prescriptions Disp Refills    Tadalafil 10 MG Oral Tab 30 tablet 6     Sig: Take 1-2 tab PO daily as needed.    lisinopril 10 MG Oral Tab 90 tablet 1     Sig: Take 1 tablet (10 mg total) by mouth daily.    metFORMIN 500 MG Oral Tab 180 tablet 1     Sig: Take 1 tablet (500 mg total) by mouth 2 (two) times daily with meals.       Health Maintenance:  Health Maintenance   Topic Date Due    Pneumococcal Vaccine: 50+ Years (1 of 1 - PCV) Never done    Zoster Vaccines (1 of 2) Never done    Colorectal Cancer Screening  09/27/2022    COVID-19 Vaccine (3 - 2024-25 season) 09/01/2024    Annual Depression Screening  01/01/2025    Influenza Vaccine (Season Ended) 10/01/2025    Annual Physical  11/13/2025    PSA  11/13/2025    DTaP,Tdap,and Td Vaccines (2 - Td or Tdap) 10/31/2026     Meningococcal B Vaccine  Aged Out         Patient/Caregiver Education: There are no barriers to learning. Medical education done.   Outcome: Patient verbalizes understanding and agrees with plan. Advised to call or RTC if symptoms persist or worsen.    Problem List:   Problem List[7]    Imaging & Referrals:  None     2025  Keily Desai MD      Patient understands plan and follow-up.  Return for annual physical in 2025.              [1]   Past Medical History:   Essential hypertension    Obesity    DELON (obstructive sleep apnea)    AHI 29 REM AHI 49 Supine AHI 46 non-supine AHI 23 Sao2 [place] 70%     Sleep apnea   [2]   Past Surgical History:  Procedure Laterality Date    Colonoscopy  2019    Hernia surgery      Other surgical history      pilonidal cyst removal   [3]   Family History  Problem Relation Age of Onset    Diabetes Mother     Hypertension Mother     Heart Attack Father     Hypertension Father     Other (Other) Father         COPD   [4]   Social History  Socioeconomic History    Marital status:    Tobacco Use    Smoking status: Former     Current packs/day: 0.00     Types: Cigarettes     Quit date: 3/26/2001     Years since quittin.2    Smokeless tobacco: Never   Vaping Use    Vaping status: Never Used   Substance and Sexual Activity    Alcohol use: Yes    Drug use: No   Other Topics Concern    Caffeine Concern Yes    Exercise Yes    Seat Belt Yes    Special Diet No    Stress Concern No    Weight Concern No     Social Drivers of Health      Received from [facility]    Housing Stability   [5]   Current Outpatient Medications   Medication Sig Dispense Refill    Tadalafil 10 MG Oral Tab Take 1-2 tab PO daily as needed. 30 tablet 6    lisinopril 10 MG Oral Tab Take 1 tablet (10 mg total) by mouth daily. 90 tablet 1    metFORMIN 500 MG Oral Tab Take 1 tablet (500 mg total) by mouth 2 (two) times daily with meals. 180 tablet 1    metFORMIN 500 MG Oral Tab  Take 1 tablet (500 mg total) by mouth 2 (two) times daily with meals. 180 tablet 0    lisinopril 10 MG Oral Tab Take 1 tablet (10 mg total) by mouth daily. 7 tablet 0   [6] No Known Allergies  [7]   Patient Active Problem List  Diagnosis    Obesity (BMI 30-39.9)    GERD (gastroesophageal reflux disease)    Prediabetes    Hyperlipidemia    DELON (obstructive sleep apnea)    Nephrolithiasis    Essential hypertension, benign    Erectile dysfunction

## 2025-06-18 NOTE — PROGRESS NOTES
The following individual(s) verbally consented to be recorded using ambient AI listening technology and understand that they can each withdraw their consent to this listening technology at any point by asking the clinician to turn off or pause the recording:    Patient name: Christian Cortesick  Additional names:  [email or web address]

## (undated) NOTE — MR AVS SNAPSHOT
[facility] [place]  [address] [facility] [place] [address] [phone]               Thank you for choosing us for your health care visit with Esvin Larry PA-C.   We are glad to serve you and happy to provide you with this summary of your ounces of fresh, warm water. Use a bulb syringe to gently squirt the water into your nose a few times a day. You can also buy ready-made saline nasal sprays.   Apply hot or cold packs  Applying heat to the area surrounding your sinuses may make you feel mor prescribed. If you have chronic liver or kidney disease or have ever had a stomach ulcer or gastrointestinal bleeding, talk with your healthcare provider before using these medicines. Do not give aspirin to anyone under 25years of age who has a fever.  It Sign up for [employer], your secure online medical record. [employer] will allow you to access patient instructions from your recent visit,  view other health information, and more. To sign up or find more information, go to [email or web address] and cl Don’t eat while distracted and slow down. Avoid over sized portions. Don’t eat while when you’re bored.      EAT THESE FOODS MORE OFTEN: EAT THESE FOODS LESS OFTEN:   Make half your plate fruits and vegetables Highly refined, white starches including wh